# Patient Record
Sex: MALE | Race: WHITE | Employment: OTHER | URBAN - METROPOLITAN AREA
[De-identification: names, ages, dates, MRNs, and addresses within clinical notes are randomized per-mention and may not be internally consistent; named-entity substitution may affect disease eponyms.]

---

## 2018-08-20 ENCOUNTER — HOSPITAL ENCOUNTER (OUTPATIENT)
Age: 58
Setting detail: SPECIMEN
Discharge: HOME | End: 2018-08-20
Payer: MEDICAID

## 2018-08-20 LAB
ALBUMIN SERPL-MCNC: 4.8 G/DL (ref 3.5–5.2)
ALBUMIN/GLOBULIN RATIO: 2.1 (ref 1–2.5)
ALP BLD-CCNC: 72 U/L (ref 40–129)
ALT SERPL-CCNC: 11 U/L (ref 5–41)
ANION GAP SERPL CALCULATED.3IONS-SCNC: 12 MMOL/L (ref 9–17)
AST SERPL-CCNC: 20 U/L
BILIRUB SERPL-MCNC: 0.65 MG/DL (ref 0.3–1.2)
BUN BLDV-MCNC: 5 MG/DL (ref 8–23)
BUN/CREAT BLD: ABNORMAL (ref 9–20)
CALCIUM SERPL-MCNC: 9.4 MG/DL (ref 8.6–10.4)
CHLORIDE BLD-SCNC: 97 MMOL/L (ref 98–107)
CHOLESTEROL, FASTING: 169 MG/DL
CHOLESTEROL/HDL RATIO: 2.7
CO2: 27 MMOL/L (ref 20–31)
CREAT SERPL-MCNC: 0.51 MG/DL (ref 0.7–1.2)
GFR AFRICAN AMERICAN: >60 ML/MIN
GFR NON-AFRICAN AMERICAN: >60 ML/MIN
GFR SERPL CREATININE-BSD FRML MDRD: ABNORMAL ML/MIN/{1.73_M2}
GFR SERPL CREATININE-BSD FRML MDRD: ABNORMAL ML/MIN/{1.73_M2}
GLUCOSE FASTING: 95 MG/DL (ref 70–99)
HDLC SERPL-MCNC: 63 MG/DL
LDL CHOLESTEROL: 93 MG/DL (ref 0–130)
POTASSIUM SERPL-SCNC: 5 MMOL/L (ref 3.7–5.3)
SODIUM BLD-SCNC: 136 MMOL/L (ref 135–144)
TOTAL PROTEIN: 7.1 G/DL (ref 6.4–8.3)
TRIGLYCERIDE, FASTING: 67 MG/DL
VLDLC SERPL CALC-MCNC: NORMAL MG/DL (ref 1–30)

## 2018-08-21 LAB
DATE, STOOL #1: NORMAL
DATE, STOOL #2: NORMAL
DATE, STOOL #3: NORMAL
HEMOCCULT SP1 STL QL: NEGATIVE
HEMOCCULT SP2 STL QL: NEGATIVE
HEMOCCULT SP3 STL QL: NEGATIVE
TIME, STOOL #1: NORMAL
TIME, STOOL #2: NORMAL
TIME, STOOL #3: NORMAL

## 2023-05-14 ENCOUNTER — APPOINTMENT (OUTPATIENT)
Dept: GENERAL RADIOLOGY | Age: 63
End: 2023-05-14
Payer: MEDICAID

## 2023-05-14 ENCOUNTER — HOSPITAL ENCOUNTER (INPATIENT)
Age: 63
LOS: 10 days | Discharge: NURSING, REHAB OR LONG-TERM CARE | End: 2023-05-24
Attending: EMERGENCY MEDICINE | Admitting: SURGERY
Payer: MEDICAID

## 2023-05-14 ENCOUNTER — APPOINTMENT (OUTPATIENT)
Dept: MRI IMAGING | Age: 63
End: 2023-05-14
Payer: MEDICAID

## 2023-05-14 ENCOUNTER — HOSPITAL ENCOUNTER (EMERGENCY)
Age: 63
Discharge: ANOTHER HOSPITAL | End: 2023-05-14
Attending: EMERGENCY MEDICINE
Payer: MEDICAID

## 2023-05-14 ENCOUNTER — APPOINTMENT (OUTPATIENT)
Dept: CT IMAGING | Age: 63
End: 2023-05-14
Payer: MEDICAID

## 2023-05-14 VITALS
OXYGEN SATURATION: 98 % | DIASTOLIC BLOOD PRESSURE: 64 MMHG | HEIGHT: 67 IN | TEMPERATURE: 98.1 F | SYSTOLIC BLOOD PRESSURE: 144 MMHG | BODY MASS INDEX: 18.4 KG/M2 | RESPIRATION RATE: 17 BRPM | HEART RATE: 112 BPM

## 2023-05-14 DIAGNOSIS — I62.9 INTRACRANIAL HEMORRHAGE (HCC): Primary | ICD-10-CM

## 2023-05-14 DIAGNOSIS — E83.42 HYPOMAGNESEMIA: ICD-10-CM

## 2023-05-14 DIAGNOSIS — E87.1 HYPONATREMIA: Primary | ICD-10-CM

## 2023-05-14 DIAGNOSIS — I62.9 INTRACRANIAL BLEED (HCC): ICD-10-CM

## 2023-05-14 DIAGNOSIS — S06.5XAA SUBDURAL HEMATOMA (HCC): ICD-10-CM

## 2023-05-14 DIAGNOSIS — S72.101A CLOSED FRACTURE OF TROCHANTER OF RIGHT FEMUR, INITIAL ENCOUNTER (HCC): ICD-10-CM

## 2023-05-14 LAB
25(OH)D3 SERPL-MCNC: <6 NG/ML
ABO/RH: NORMAL
ABSOLUTE EOS #: 0 K/UL (ref 0–0.4)
ABSOLUTE LYMPH #: 0.7 K/UL (ref 1–4.8)
ABSOLUTE MONO #: 0.5 K/UL (ref 0.1–1.3)
ALBUMIN SERPL-MCNC: 3.4 G/DL (ref 3.5–5.2)
ALBUMIN SERPL-MCNC: 4 G/DL (ref 3.5–5.2)
ALLEN TEST: ABNORMAL
ALP SERPL-CCNC: 93 U/L (ref 40–129)
ALT SERPL-CCNC: 48 U/L (ref 5–41)
ANION GAP SERPL CALCULATED.3IONS-SCNC: 12 MMOL/L (ref 9–17)
ANION GAP SERPL CALCULATED.3IONS-SCNC: 16 MMOL/L (ref 9–17)
ANTIBODY SCREEN: NEGATIVE
ARM BAND NUMBER: NORMAL
AST SERPL-CCNC: 59 U/L
BASOPHILS # BLD: 1 % (ref 0–2)
BASOPHILS ABSOLUTE: 0.1 K/UL (ref 0–0.2)
BILIRUB SERPL-MCNC: 1 MG/DL (ref 0.3–1.2)
BLOOD BANK SPECIMEN: ABNORMAL
BUN SERPL-MCNC: 4 MG/DL (ref 8–23)
BUN SERPL-MCNC: 5 MG/DL (ref 8–23)
CALCIUM SERPL-MCNC: 9.2 MG/DL (ref 8.6–10.4)
CARBOXYHEMOGLOBIN: ABNORMAL %
CHLORIDE SERPL-SCNC: 82 MMOL/L (ref 98–107)
CHLORIDE SERPL-SCNC: 82 MMOL/L (ref 98–107)
CO2 SERPL-SCNC: 22 MMOL/L (ref 20–31)
CO2 SERPL-SCNC: 28 MMOL/L (ref 20–31)
COLLAGEN ADENOSINE-5'-DIPHOSPHATE (ADP) TIME: 110 SEC (ref 67–112)
COLLAGEN EPINEPHRINE TIME: 192 SEC (ref 85–172)
CREAT SERPL-MCNC: 0.22 MG/DL (ref 0.7–1.2)
CREAT SERPL-MCNC: <0.4 MG/DL (ref 0.7–1.2)
EOSINOPHILS RELATIVE PERCENT: 0 % (ref 0–4)
ETHANOL PERCENT: 0.02 %
ETHANOL PERCENT: <0.01 %
ETHANOL: 18 MG/DL
ETHANOL: <10 MG/DL
EXPIRATION DATE: NORMAL
FIO2: ABNORMAL
GFR SERPL CREATININE-BSD FRML MDRD: >60 ML/MIN/1.73M2
GFR SERPL CREATININE-BSD FRML MDRD: ABNORMAL ML/MIN/1.73M2
GLUCOSE SERPL-MCNC: 101 MG/DL (ref 70–99)
GLUCOSE SERPL-MCNC: 90 MG/DL (ref 70–99)
HCG QUALITATIVE: ABNORMAL
HCO3 VENOUS: ABNORMAL MMOL/L (ref 24–30)
HCT VFR BLD AUTO: 32.2 % (ref 40.7–50.3)
HCT VFR BLD AUTO: 37.6 % (ref 41–53)
HGB BLD-MCNC: 11.9 G/DL (ref 13–17)
HGB BLD-MCNC: 13.2 G/DL (ref 13.5–17.5)
INR PPP: 1.1
INR PPP: 1.1
LACTIC ACID, SEPSIS: 2.4 MMOL/L (ref 0.5–1.9)
LYMPHOCYTES # BLD: 8 % (ref 24–44)
MAGNESIUM SERPL-MCNC: 1.1 MG/DL (ref 1.6–2.6)
MCH RBC QN AUTO: 35.4 PG (ref 26–34)
MCH RBC QN AUTO: 35.7 PG (ref 25.2–33.5)
MCHC RBC AUTO-ENTMCNC: 35.1 G/DL (ref 31–37)
MCHC RBC AUTO-ENTMCNC: 37 G/DL (ref 28.4–34.8)
MCV RBC AUTO: 100.7 FL (ref 80–100)
MCV RBC AUTO: 96.7 FL (ref 82.6–102.9)
METHEMOGLOBIN: ABNORMAL %
MODE: ABNORMAL
MONOCYTES # BLD: 6 % (ref 1–7)
NEGATIVE BASE EXCESS, VEN: ABNORMAL MMOL/L (ref 0–2)
NOTIFICATION TIME: ABNORMAL
NOTIFICATION: ABNORMAL
NRBC AUTOMATED: 0 PER 100 WBC
O2 DEVICE/FLOW/%: ABNORMAL
O2 SAT, VEN: ABNORMAL %
OXYHEMOGLOBIN: ABNORMAL % (ref 95–98)
PARTIAL THROMBOPLASTIN TIME: 26.1 SEC (ref 23–36.5)
PATIENT TEMP: ABNORMAL
PCO2, VEN, TEMP ADJ: ABNORMAL MMHG (ref 39–55)
PCO2, VEN: ABNORMAL MM HG (ref 39–55)
PDW BLD-RTO: 11.7 % (ref 11.8–14.4)
PDW BLD-RTO: 13 % (ref 11.5–14.9)
PEEP/CPAP: ABNORMAL
PH VENOUS: ABNORMAL (ref 7.32–7.42)
PH, VEN, TEMP ADJ: ABNORMAL (ref 7.32–7.42)
PLATELET # BLD AUTO: 168 K/UL (ref 138–453)
PLATELET # BLD AUTO: 204 K/UL (ref 150–450)
PLATELET FUNCTION INTERP: ABNORMAL
PMV BLD AUTO: 7.1 FL (ref 6–12)
PMV BLD AUTO: 9 FL (ref 8.1–13.5)
PO2, VEN, TEMP ADJ: ABNORMAL MMHG (ref 30–50)
PO2, VEN: ABNORMAL MM HG (ref 30–50)
POSITIVE BASE EXCESS, VEN: ABNORMAL MMOL/L (ref 0–2)
POTASSIUM SERPL-SCNC: 3.7 MMOL/L (ref 3.7–5.3)
POTASSIUM SERPL-SCNC: 3.7 MMOL/L (ref 3.7–5.3)
PROT SERPL-MCNC: 6.6 G/DL (ref 6.4–8.3)
PROTHROMBIN TIME: 13.9 SEC (ref 11.7–14.9)
PROTHROMBIN TIME: 14.6 SEC (ref 11.8–14.6)
PSV: ABNORMAL
PT. POSITION: ABNORMAL
RBC # BLD: 3.33 M/UL (ref 4.21–5.77)
RBC # BLD: 3.74 M/UL (ref 4.5–5.9)
RESPIRATORY RATE: ABNORMAL
SAMPLE SITE: ABNORMAL
SEG NEUTROPHILS: 85 % (ref 36–66)
SEGMENTED NEUTROPHILS ABSOLUTE COUNT: 7.6 K/UL (ref 1.3–9.1)
SET RATE: ABNORMAL
SODIUM SERPL-SCNC: 120 MMOL/L (ref 135–144)
SODIUM SERPL-SCNC: 122 MMOL/L (ref 135–144)
T4 FREE SERPL-MCNC: 1.1 NG/DL (ref 0.9–1.7)
TEXT FOR RESPIRATORY: ABNORMAL
TOTAL HB: ABNORMAL G/DL (ref 12–16)
TOTAL RATE: ABNORMAL
TROPONIN I SERPL DL<=0.01 NG/ML-MCNC: 39 NG/L (ref 0–22)
TSH SERPL-ACNC: 2.27 UIU/ML (ref 0.3–5)
VIT B12 SERPL-MCNC: 560 PG/ML (ref 232–1245)
VT: ABNORMAL
WBC # BLD AUTO: 8.4 K/UL (ref 3.5–11.3)
WBC # BLD AUTO: 9 K/UL (ref 3.5–11)

## 2023-05-14 PROCEDURE — 82306 VITAMIN D 25 HYDROXY: CPT

## 2023-05-14 PROCEDURE — 73562 X-RAY EXAM OF KNEE 3: CPT

## 2023-05-14 PROCEDURE — 86850 RBC ANTIBODY SCREEN: CPT

## 2023-05-14 PROCEDURE — 2500000003 HC RX 250 WO HCPCS: Performed by: STUDENT IN AN ORGANIZED HEALTH CARE EDUCATION/TRAINING PROGRAM

## 2023-05-14 PROCEDURE — 83036 HEMOGLOBIN GLYCOSYLATED A1C: CPT

## 2023-05-14 PROCEDURE — 82040 ASSAY OF SERUM ALBUMIN: CPT

## 2023-05-14 PROCEDURE — 36415 COLL VENOUS BLD VENIPUNCTURE: CPT

## 2023-05-14 PROCEDURE — 71045 X-RAY EXAM CHEST 1 VIEW: CPT

## 2023-05-14 PROCEDURE — 6360000002 HC RX W HCPCS: Performed by: EMERGENCY MEDICINE

## 2023-05-14 PROCEDURE — 70450 CT HEAD/BRAIN W/O DYE: CPT

## 2023-05-14 PROCEDURE — 72125 CT NECK SPINE W/O DYE: CPT

## 2023-05-14 PROCEDURE — 99285 EMERGENCY DEPT VISIT HI MDM: CPT

## 2023-05-14 PROCEDURE — 6360000004 HC RX CONTRAST MEDICATION: Performed by: STUDENT IN AN ORGANIZED HEALTH CARE EDUCATION/TRAINING PROGRAM

## 2023-05-14 PROCEDURE — 82947 ASSAY GLUCOSE BLOOD QUANT: CPT

## 2023-05-14 PROCEDURE — 84443 ASSAY THYROID STIM HORMONE: CPT

## 2023-05-14 PROCEDURE — 73502 X-RAY EXAM HIP UNI 2-3 VIEWS: CPT

## 2023-05-14 PROCEDURE — 2580000003 HC RX 258: Performed by: EMERGENCY MEDICINE

## 2023-05-14 PROCEDURE — 2500000003 HC RX 250 WO HCPCS: Performed by: EMERGENCY MEDICINE

## 2023-05-14 PROCEDURE — 3209999900 CT LUMBAR SPINE TRAUMA RECONSTRUCTION

## 2023-05-14 PROCEDURE — 85610 PROTHROMBIN TIME: CPT

## 2023-05-14 PROCEDURE — 96365 THER/PROPH/DIAG IV INF INIT: CPT

## 2023-05-14 PROCEDURE — 2000000000 HC ICU R&B

## 2023-05-14 PROCEDURE — 71260 CT THORAX DX C+: CPT

## 2023-05-14 PROCEDURE — G0480 DRUG TEST DEF 1-7 CLASSES: HCPCS

## 2023-05-14 PROCEDURE — 83735 ASSAY OF MAGNESIUM: CPT

## 2023-05-14 PROCEDURE — 6810039001 HC L1 TRAUMA PRIORITY

## 2023-05-14 PROCEDURE — 99223 1ST HOSP IP/OBS HIGH 75: CPT | Performed by: SURGERY

## 2023-05-14 PROCEDURE — 2580000003 HC RX 258: Performed by: STUDENT IN AN ORGANIZED HEALTH CARE EDUCATION/TRAINING PROGRAM

## 2023-05-14 PROCEDURE — 85025 COMPLETE CBC W/AUTO DIFF WBC: CPT

## 2023-05-14 PROCEDURE — 6370000000 HC RX 637 (ALT 250 FOR IP): Performed by: STUDENT IN AN ORGANIZED HEALTH CARE EDUCATION/TRAINING PROGRAM

## 2023-05-14 PROCEDURE — 85027 COMPLETE CBC AUTOMATED: CPT

## 2023-05-14 PROCEDURE — 96368 THER/DIAG CONCURRENT INF: CPT

## 2023-05-14 PROCEDURE — 93005 ELECTROCARDIOGRAM TRACING: CPT

## 2023-05-14 PROCEDURE — 6360000002 HC RX W HCPCS: Performed by: STUDENT IN AN ORGANIZED HEALTH CARE EDUCATION/TRAINING PROGRAM

## 2023-05-14 PROCEDURE — 86901 BLOOD TYPING SEROLOGIC RH(D): CPT

## 2023-05-14 PROCEDURE — 86900 BLOOD TYPING SEROLOGIC ABO: CPT

## 2023-05-14 PROCEDURE — 2500000003 HC RX 250 WO HCPCS

## 2023-05-14 PROCEDURE — 72170 X-RAY EXAM OF PELVIS: CPT

## 2023-05-14 PROCEDURE — 3209999900 CT THORACIC SPINE TRAUMA RECONSTRUCTION

## 2023-05-14 PROCEDURE — 82607 VITAMIN B-12: CPT

## 2023-05-14 PROCEDURE — 83605 ASSAY OF LACTIC ACID: CPT

## 2023-05-14 PROCEDURE — P9073 PLATELETS PHERESIS PATH REDU: HCPCS

## 2023-05-14 PROCEDURE — 82565 ASSAY OF CREATININE: CPT

## 2023-05-14 PROCEDURE — 73590 X-RAY EXAM OF LOWER LEG: CPT

## 2023-05-14 PROCEDURE — 84484 ASSAY OF TROPONIN QUANT: CPT

## 2023-05-14 PROCEDURE — 85576 BLOOD PLATELET AGGREGATION: CPT

## 2023-05-14 PROCEDURE — 84520 ASSAY OF UREA NITROGEN: CPT

## 2023-05-14 PROCEDURE — 80053 COMPREHEN METABOLIC PANEL: CPT

## 2023-05-14 PROCEDURE — 36430 TRANSFUSION BLD/BLD COMPNT: CPT

## 2023-05-14 PROCEDURE — 73552 X-RAY EXAM OF FEMUR 2/>: CPT

## 2023-05-14 PROCEDURE — 82805 BLOOD GASES W/O2 SATURATION: CPT

## 2023-05-14 PROCEDURE — 85730 THROMBOPLASTIN TIME PARTIAL: CPT

## 2023-05-14 PROCEDURE — 84439 ASSAY OF FREE THYROXINE: CPT

## 2023-05-14 PROCEDURE — 93005 ELECTROCARDIOGRAM TRACING: CPT | Performed by: EMERGENCY MEDICINE

## 2023-05-14 PROCEDURE — 84703 CHORIONIC GONADOTROPIN ASSAY: CPT

## 2023-05-14 PROCEDURE — 80051 ELECTROLYTE PANEL: CPT

## 2023-05-14 RX ORDER — MULTIVITAMIN WITH IRON
1 TABLET ORAL DAILY
Status: DISCONTINUED | OUTPATIENT
Start: 2023-05-14 | End: 2023-05-24 | Stop reason: HOSPADM

## 2023-05-14 RX ORDER — SODIUM CHLORIDE 9 MG/ML
INJECTION, SOLUTION INTRAVENOUS PRN
Status: DISCONTINUED | OUTPATIENT
Start: 2023-05-14 | End: 2023-05-15

## 2023-05-14 RX ORDER — SODIUM CHLORIDE 9 MG/ML
INJECTION, SOLUTION INTRAVENOUS CONTINUOUS
Status: DISCONTINUED | OUTPATIENT
Start: 2023-05-14 | End: 2023-05-14 | Stop reason: HOSPADM

## 2023-05-14 RX ORDER — ONDANSETRON 2 MG/ML
4 INJECTION INTRAMUSCULAR; INTRAVENOUS EVERY 6 HOURS PRN
Status: DISCONTINUED | OUTPATIENT
Start: 2023-05-14 | End: 2023-05-24 | Stop reason: HOSPADM

## 2023-05-14 RX ORDER — SENNA PLUS 8.6 MG/1
1 TABLET ORAL DAILY PRN
Status: DISCONTINUED | OUTPATIENT
Start: 2023-05-14 | End: 2023-05-15

## 2023-05-14 RX ORDER — MAGNESIUM SULFATE HEPTAHYDRATE 40 MG/ML
2000 INJECTION, SOLUTION INTRAVENOUS ONCE
Status: COMPLETED | OUTPATIENT
Start: 2023-05-14 | End: 2023-05-14

## 2023-05-14 RX ORDER — SODIUM CHLORIDE 0.9 % (FLUSH) 0.9 %
10 SYRINGE (ML) INJECTION PRN
Status: DISCONTINUED | OUTPATIENT
Start: 2023-05-14 | End: 2023-05-24 | Stop reason: HOSPADM

## 2023-05-14 RX ORDER — POLYETHYLENE GLYCOL 3350 17 G/17G
17 POWDER, FOR SOLUTION ORAL DAILY
Status: DISCONTINUED | OUTPATIENT
Start: 2023-05-14 | End: 2023-05-24 | Stop reason: HOSPADM

## 2023-05-14 RX ORDER — SODIUM CHLORIDE 9 MG/ML
INJECTION, SOLUTION INTRAVENOUS CONTINUOUS
Status: DISCONTINUED | OUTPATIENT
Start: 2023-05-14 | End: 2023-05-17

## 2023-05-14 RX ORDER — LANOLIN ALCOHOL/MO/W.PET/CERES
100 CREAM (GRAM) TOPICAL DAILY
Status: DISCONTINUED | OUTPATIENT
Start: 2023-05-14 | End: 2023-05-15

## 2023-05-14 RX ORDER — PHENOBARBITAL 32.4 MG/1
32.4 TABLET ORAL 3 TIMES DAILY
Status: COMPLETED | OUTPATIENT
Start: 2023-05-17 | End: 2023-05-18

## 2023-05-14 RX ORDER — LEVETIRACETAM 10 MG/ML
1000 INJECTION INTRAVASCULAR ONCE
Status: COMPLETED | OUTPATIENT
Start: 2023-05-14 | End: 2023-05-14

## 2023-05-14 RX ORDER — ACETAMINOPHEN 650 MG/1
650 SUPPOSITORY RECTAL EVERY 6 HOURS PRN
Status: DISCONTINUED | OUTPATIENT
Start: 2023-05-14 | End: 2023-05-14

## 2023-05-14 RX ORDER — 0.9 % SODIUM CHLORIDE 0.9 %
1000 INTRAVENOUS SOLUTION INTRAVENOUS ONCE
Status: COMPLETED | OUTPATIENT
Start: 2023-05-14 | End: 2023-05-14

## 2023-05-14 RX ORDER — DIAZEPAM 5 MG/ML
5 INJECTION, SOLUTION INTRAMUSCULAR; INTRAVENOUS EVERY 6 HOURS PRN
Status: DISCONTINUED | OUTPATIENT
Start: 2023-05-14 | End: 2023-05-14

## 2023-05-14 RX ORDER — THIAMINE HYDROCHLORIDE 100 MG/ML
100 INJECTION, SOLUTION INTRAMUSCULAR; INTRAVENOUS DAILY
Status: DISCONTINUED | OUTPATIENT
Start: 2023-05-14 | End: 2023-05-14

## 2023-05-14 RX ORDER — SODIUM CHLORIDE 0.9 % (FLUSH) 0.9 %
10 SYRINGE (ML) INJECTION EVERY 12 HOURS SCHEDULED
Status: DISCONTINUED | OUTPATIENT
Start: 2023-05-14 | End: 2023-05-24 | Stop reason: HOSPADM

## 2023-05-14 RX ORDER — METOPROLOL TARTRATE 5 MG/5ML
5 INJECTION INTRAVENOUS ONCE
Status: COMPLETED | OUTPATIENT
Start: 2023-05-14 | End: 2023-05-14

## 2023-05-14 RX ORDER — ONDANSETRON 4 MG/1
4 TABLET, ORALLY DISINTEGRATING ORAL EVERY 8 HOURS PRN
Status: DISCONTINUED | OUTPATIENT
Start: 2023-05-14 | End: 2023-05-24 | Stop reason: HOSPADM

## 2023-05-14 RX ORDER — OXYCODONE HYDROCHLORIDE 5 MG/1
5 TABLET ORAL EVERY 6 HOURS PRN
Status: DISCONTINUED | OUTPATIENT
Start: 2023-05-14 | End: 2023-05-15

## 2023-05-14 RX ORDER — ERGOCALCIFEROL 1.25 MG/1
50000 CAPSULE ORAL WEEKLY
Qty: 8 CAPSULE | Refills: 0 | Status: ON HOLD | OUTPATIENT
Start: 2023-05-14 | End: 2023-07-03

## 2023-05-14 RX ORDER — ACETAMINOPHEN 500 MG
1000 TABLET ORAL EVERY 8 HOURS SCHEDULED
Status: DISCONTINUED | OUTPATIENT
Start: 2023-05-14 | End: 2023-05-24 | Stop reason: HOSPADM

## 2023-05-14 RX ORDER — ACETAMINOPHEN 325 MG/1
650 TABLET ORAL EVERY 6 HOURS PRN
Status: DISCONTINUED | OUTPATIENT
Start: 2023-05-14 | End: 2023-05-14

## 2023-05-14 RX ORDER — PHENOBARBITAL 64.8 MG/1
64.8 TABLET ORAL 3 TIMES DAILY
Status: COMPLETED | OUTPATIENT
Start: 2023-05-14 | End: 2023-05-16

## 2023-05-14 RX ORDER — FOLIC ACID 1 MG/1
1 TABLET ORAL DAILY
Status: DISCONTINUED | OUTPATIENT
Start: 2023-05-14 | End: 2023-05-24 | Stop reason: HOSPADM

## 2023-05-14 RX ORDER — METHOCARBAMOL 500 MG/1
500 TABLET, FILM COATED ORAL 4 TIMES DAILY
Status: DISCONTINUED | OUTPATIENT
Start: 2023-05-14 | End: 2023-05-15

## 2023-05-14 RX ADMIN — METOPROLOL TARTRATE 5 MG: 1 INJECTION, SOLUTION INTRAVENOUS at 23:42

## 2023-05-14 RX ADMIN — SODIUM CHLORIDE: 9 INJECTION, SOLUTION INTRAVENOUS at 20:15

## 2023-05-14 RX ADMIN — SODIUM CHLORIDE: 9 INJECTION, SOLUTION INTRAVENOUS at 14:47

## 2023-05-14 RX ADMIN — PHENOBARBITAL 64.8 MG: 64.8 TABLET ORAL at 20:27

## 2023-05-14 RX ADMIN — THIAMINE HYDROCHLORIDE 100 MG: 200 INJECTION, SOLUTION INTRAMUSCULAR; INTRAVENOUS at 14:46

## 2023-05-14 RX ADMIN — Medication 100 MG: at 20:32

## 2023-05-14 RX ADMIN — SODIUM CHLORIDE 1000 ML: 9 INJECTION, SOLUTION INTRAVENOUS at 13:32

## 2023-05-14 RX ADMIN — METHOCARBAMOL 500 MG: 500 TABLET ORAL at 20:33

## 2023-05-14 RX ADMIN — LEVETIRACETAM 1000 MG: 10 INJECTION, SOLUTION INTRAVENOUS at 16:47

## 2023-05-14 RX ADMIN — ALCOHOL 1 TABLET: 70.47 GEL TOPICAL at 20:26

## 2023-05-14 RX ADMIN — ACETAMINOPHEN 1000 MG: 500 TABLET ORAL at 20:33

## 2023-05-14 RX ADMIN — MAGNESIUM SULFATE HEPTAHYDRATE 2000 MG: 40 INJECTION, SOLUTION INTRAVENOUS at 14:28

## 2023-05-14 RX ADMIN — SODIUM CHLORIDE, PRESERVATIVE FREE 20 MG: 5 INJECTION INTRAVENOUS at 20:28

## 2023-05-14 RX ADMIN — FOLIC ACID 1 MG: 1 TABLET ORAL at 20:33

## 2023-05-14 RX ADMIN — IOPAMIDOL 130 ML: 755 INJECTION, SOLUTION INTRAVENOUS at 16:03

## 2023-05-14 ASSESSMENT — ENCOUNTER SYMPTOMS
SORE THROAT: 0
COUGH: 0
VOMITING: 0
BACK PAIN: 1
SHORTNESS OF BREATH: 0
COLOR CHANGE: 0
ABDOMINAL PAIN: 0
NAUSEA: 0

## 2023-05-14 ASSESSMENT — LIFESTYLE VARIABLES
HOW OFTEN DO YOU HAVE A DRINK CONTAINING ALCOHOL: 4 OR MORE TIMES A WEEK
HOW MANY STANDARD DRINKS CONTAINING ALCOHOL DO YOU HAVE ON A TYPICAL DAY: 10 OR MORE

## 2023-05-15 ENCOUNTER — APPOINTMENT (OUTPATIENT)
Age: 63
End: 2023-05-15
Payer: MEDICAID

## 2023-05-15 ENCOUNTER — APPOINTMENT (OUTPATIENT)
Dept: MRI IMAGING | Age: 63
End: 2023-05-15
Payer: MEDICAID

## 2023-05-15 LAB
ALBUMIN SERPL-MCNC: 2.8 G/DL (ref 3.5–5.2)
ALBUMIN/GLOBULIN RATIO: 1.3 (ref 1–2.5)
ALP SERPL-CCNC: 61 U/L (ref 40–129)
ALT SERPL-CCNC: 34 U/L (ref 5–41)
AMMONIA PLAS-SCNC: 25 UMOL/L (ref 16–60)
ANION GAP SERPL CALCULATED.3IONS-SCNC: 10 MMOL/L (ref 9–17)
AST SERPL-CCNC: 54 U/L
BILIRUB DIRECT SERPL-MCNC: 0.4 MG/DL
BILIRUB INDIRECT SERPL-MCNC: 0.5 MG/DL (ref 0–1)
BILIRUB SERPL-MCNC: 0.9 MG/DL (ref 0.3–1.2)
BILIRUB UR QL STRIP: ABNORMAL
BLD PROD TYP BPU: NORMAL
BLOOD BANK BLOOD PRODUCT EXPIRATION DATE: NORMAL
BLOOD BANK ISBT PRODUCT BLOOD TYPE: 6200
BLOOD BANK PRODUCT CODE: NORMAL
BLOOD BANK UNIT TYPE AND RH: NORMAL
BPU ID: NORMAL
BUN SERPL-MCNC: 3 MG/DL (ref 8–23)
CA-I BLD-SCNC: 0.93 MMOL/L (ref 1.13–1.33)
CA-I BLD-SCNC: 1.05 MMOL/L (ref 1.13–1.33)
CALCIUM SERPL-MCNC: 8.3 MG/DL (ref 8.6–10.4)
CASTS #/AREA URNS LPF: NORMAL /LPF (ref 0–8)
CHLORIDE SERPL-SCNC: 91 MMOL/L (ref 98–107)
CLARITY UR: CLEAR
CO2 SERPL-SCNC: 25 MMOL/L (ref 20–31)
COLOR UR: ABNORMAL
CREAT SERPL-MCNC: 0.31 MG/DL (ref 0.7–1.2)
DISPENSE STATUS BLOOD BANK: NORMAL
EKG ATRIAL RATE: 115 BPM
EKG ATRIAL RATE: 144 BPM
EKG P AXIS: 85 DEGREES
EKG P-R INTERVAL: 164 MS
EKG Q-T INTERVAL: 318 MS
EKG Q-T INTERVAL: 324 MS
EKG QRS DURATION: 86 MS
EKG QRS DURATION: 86 MS
EKG QTC CALCULATION (BAZETT): 439 MS
EKG QTC CALCULATION (BAZETT): 465 MS
EKG R AXIS: -78 DEGREES
EKG R AXIS: -82 DEGREES
EKG T AXIS: 112 DEGREES
EKG T AXIS: 88 DEGREES
EKG VENTRICULAR RATE: 115 BPM
EKG VENTRICULAR RATE: 124 BPM
EPI CELLS #/AREA URNS HPF: NORMAL /HPF (ref 0–5)
ERYTHROCYTE [DISTWIDTH] IN BLOOD BY AUTOMATED COUNT: 12 % (ref 11.8–14.4)
EST. AVERAGE GLUCOSE BLD GHB EST-MCNC: 97 MG/DL
ETHANOL PERCENT: <0.01 %
ETHANOL: <10 MG/DL
GFR SERPL CREATININE-BSD FRML MDRD: >60 ML/MIN/1.73M2
GLUCOSE SERPL-MCNC: 84 MG/DL (ref 70–99)
GLUCOSE UR STRIP.AUTO-MCNC: NEGATIVE MG/DL
HBA1C MFR BLD: 5 % (ref 4–6)
HCT VFR BLD AUTO: 29 % (ref 40.7–50.3)
HCT VFR BLD AUTO: 35.3 % (ref 40.7–50.3)
HGB BLD-MCNC: 10 G/DL (ref 13–17)
HGB BLD-MCNC: 12.2 G/DL (ref 13–17)
HGB UR QL STRIP.AUTO: ABNORMAL
KETONES UR STRIP.AUTO-MCNC: ABNORMAL MG/DL
LEUKOCYTE ESTERASE UR QL STRIP: NEGATIVE
MAGNESIUM SERPL-MCNC: 1.5 MG/DL (ref 1.6–2.6)
MAGNESIUM SERPL-MCNC: 2.7 MG/DL (ref 1.6–2.6)
MCH RBC QN AUTO: 35.3 PG (ref 25.2–33.5)
MCH RBC QN AUTO: 36 PG (ref 25.2–33.5)
MCHC RBC AUTO-ENTMCNC: 34.5 G/DL (ref 28.4–34.8)
MCHC RBC AUTO-ENTMCNC: 34.6 G/DL (ref 28.4–34.8)
MCV RBC AUTO: 102 FL (ref 82.6–102.9)
MCV RBC AUTO: 104.3 FL (ref 82.6–102.9)
NITRITE UR QL STRIP: NEGATIVE
NRBC AUTOMATED: 0 PER 100 WBC
NRBC AUTOMATED: 0 PER 100 WBC
PDW BLD-RTO: 11.5 % (ref 11.8–14.4)
PHOSPHATE SERPL-MCNC: 3.4 MG/DL (ref 2.5–4.5)
PHOSPHATE SERPL-MCNC: 3.4 MG/DL (ref 2.5–4.5)
PLATELET # BLD AUTO: 146 K/UL (ref 138–453)
PLATELET # BLD AUTO: 155 K/UL (ref 138–453)
PMV BLD AUTO: 8.6 FL (ref 8.1–13.5)
PMV BLD AUTO: 9.3 FL (ref 8.1–13.5)
POTASSIUM SERPL-SCNC: 3.6 MMOL/L (ref 3.7–5.3)
PROT SERPL-MCNC: 5 G/DL (ref 6.4–8.3)
PROT UR STRIP-MCNC: NEGATIVE MG/DL
PROT UR STRIP.AUTO-MCNC: 6 MG/DL (ref 5–8)
RBC # BLD AUTO: 2.78 M/UL (ref 4.21–5.77)
RBC # BLD: 3.46 M/UL (ref 4.21–5.77)
RBC #/AREA URNS HPF: NORMAL /HPF (ref 0–4)
SODIUM SERPL-SCNC: 126 MMOL/L (ref 135–144)
SP GR UR STRIP.AUTO: 1.02 (ref 1–1.03)
TRANSFUSION STATUS: NORMAL
TROPONIN I SERPL DL<=0.01 NG/ML-MCNC: 42 NG/L (ref 0–22)
TROPONIN I SERPL DL<=0.01 NG/ML-MCNC: 49 NG/L (ref 0–22)
TROPONIN I SERPL DL<=0.01 NG/ML-MCNC: 50 NG/L (ref 0–22)
UNIT DIVISION: 0
UNIT ISSUE DATE/TIME: NORMAL
UROBILINOGEN UR STRIP-ACNC: NORMAL
WBC # BLD AUTO: 7 K/UL (ref 3.5–11.3)
WBC #/AREA URNS HPF: NORMAL /HPF (ref 0–5)
WBC OTHER # BLD: 5 K/UL (ref 3.5–11.3)

## 2023-05-15 PROCEDURE — 84484 ASSAY OF TROPONIN QUANT: CPT

## 2023-05-15 PROCEDURE — 6370000000 HC RX 637 (ALT 250 FOR IP)

## 2023-05-15 PROCEDURE — 2500000003 HC RX 250 WO HCPCS: Performed by: SURGERY

## 2023-05-15 PROCEDURE — 6370000000 HC RX 637 (ALT 250 FOR IP): Performed by: STUDENT IN AN ORGANIZED HEALTH CARE EDUCATION/TRAINING PROGRAM

## 2023-05-15 PROCEDURE — 2580000003 HC RX 258

## 2023-05-15 PROCEDURE — 6360000002 HC RX W HCPCS: Performed by: NURSE PRACTITIONER

## 2023-05-15 PROCEDURE — 80076 HEPATIC FUNCTION PANEL: CPT

## 2023-05-15 PROCEDURE — 51701 INSERT BLADDER CATHETER: CPT

## 2023-05-15 PROCEDURE — 51798 US URINE CAPACITY MEASURE: CPT

## 2023-05-15 PROCEDURE — 2580000003 HC RX 258: Performed by: STUDENT IN AN ORGANIZED HEALTH CARE EDUCATION/TRAINING PROGRAM

## 2023-05-15 PROCEDURE — 94640 AIRWAY INHALATION TREATMENT: CPT

## 2023-05-15 PROCEDURE — 70450 CT HEAD/BRAIN W/O DYE: CPT

## 2023-05-15 PROCEDURE — 6360000002 HC RX W HCPCS: Performed by: STUDENT IN AN ORGANIZED HEALTH CARE EDUCATION/TRAINING PROGRAM

## 2023-05-15 PROCEDURE — 93005 ELECTROCARDIOGRAM TRACING: CPT | Performed by: EMERGENCY MEDICINE

## 2023-05-15 PROCEDURE — 84100 ASSAY OF PHOSPHORUS: CPT

## 2023-05-15 PROCEDURE — 99233 SBSQ HOSP IP/OBS HIGH 50: CPT | Performed by: SURGERY

## 2023-05-15 PROCEDURE — 80048 BASIC METABOLIC PNL TOTAL CA: CPT

## 2023-05-15 PROCEDURE — 81001 URINALYSIS AUTO W/SCOPE: CPT

## 2023-05-15 PROCEDURE — 93010 ELECTROCARDIOGRAM REPORT: CPT | Performed by: INTERNAL MEDICINE

## 2023-05-15 PROCEDURE — 36415 COLL VENOUS BLD VENIPUNCTURE: CPT

## 2023-05-15 PROCEDURE — 83735 ASSAY OF MAGNESIUM: CPT

## 2023-05-15 PROCEDURE — 82330 ASSAY OF CALCIUM: CPT

## 2023-05-15 PROCEDURE — 82140 ASSAY OF AMMONIA: CPT

## 2023-05-15 PROCEDURE — 6360000002 HC RX W HCPCS

## 2023-05-15 PROCEDURE — 6370000000 HC RX 637 (ALT 250 FOR IP): Performed by: NURSE PRACTITIONER

## 2023-05-15 PROCEDURE — 85027 COMPLETE CBC AUTOMATED: CPT

## 2023-05-15 PROCEDURE — 94761 N-INVAS EAR/PLS OXIMETRY MLT: CPT

## 2023-05-15 PROCEDURE — 73721 MRI JNT OF LWR EXTRE W/O DYE: CPT

## 2023-05-15 PROCEDURE — G0480 DRUG TEST DEF 1-7 CLASSES: HCPCS

## 2023-05-15 PROCEDURE — 2060000000 HC ICU INTERMEDIATE R&B

## 2023-05-15 PROCEDURE — 2580000003 HC RX 258: Performed by: NURSE PRACTITIONER

## 2023-05-15 RX ORDER — ALBUTEROL SULFATE 90 UG/1
2 AEROSOL, METERED RESPIRATORY (INHALATION) EVERY 4 HOURS PRN
Status: DISCONTINUED | OUTPATIENT
Start: 2023-05-15 | End: 2023-05-24 | Stop reason: HOSPADM

## 2023-05-15 RX ORDER — MAGNESIUM SULFATE IN WATER 40 MG/ML
2000 INJECTION, SOLUTION INTRAVENOUS ONCE
Status: COMPLETED | OUTPATIENT
Start: 2023-05-15 | End: 2023-05-15

## 2023-05-15 RX ORDER — MAGNESIUM SULFATE IN WATER 40 MG/ML
2000 INJECTION, SOLUTION INTRAVENOUS
Status: DISPENSED | OUTPATIENT
Start: 2023-05-15 | End: 2023-05-15

## 2023-05-15 RX ORDER — SODIUM CHLORIDE, SODIUM LACTATE, POTASSIUM CHLORIDE, AND CALCIUM CHLORIDE .6; .31; .03; .02 G/100ML; G/100ML; G/100ML; G/100ML
1000 INJECTION, SOLUTION INTRAVENOUS ONCE
Status: COMPLETED | OUTPATIENT
Start: 2023-05-15 | End: 2023-05-15

## 2023-05-15 RX ORDER — HALOPERIDOL 5 MG/ML
5 INJECTION INTRAMUSCULAR ONCE
Status: COMPLETED | OUTPATIENT
Start: 2023-05-15 | End: 2023-05-15

## 2023-05-15 RX ORDER — POTASSIUM CHLORIDE 20MEQ/15ML
40 LIQUID (ML) ORAL ONCE
Status: COMPLETED | OUTPATIENT
Start: 2023-05-15 | End: 2023-05-15

## 2023-05-15 RX ORDER — SENNA PLUS 8.6 MG/1
1 TABLET ORAL NIGHTLY
Status: DISCONTINUED | OUTPATIENT
Start: 2023-05-15 | End: 2023-05-24 | Stop reason: HOSPADM

## 2023-05-15 RX ORDER — BUDESONIDE AND FORMOTEROL FUMARATE DIHYDRATE 80; 4.5 UG/1; UG/1
2 AEROSOL RESPIRATORY (INHALATION) 2 TIMES DAILY
Status: DISCONTINUED | OUTPATIENT
Start: 2023-05-15 | End: 2023-05-24 | Stop reason: HOSPADM

## 2023-05-15 RX ORDER — FENTANYL CITRATE 50 UG/ML
25 INJECTION, SOLUTION INTRAMUSCULAR; INTRAVENOUS ONCE
Status: DISCONTINUED | OUTPATIENT
Start: 2023-05-15 | End: 2023-05-16

## 2023-05-15 RX ORDER — LANOLIN ALCOHOL/MO/W.PET/CERES
100 CREAM (GRAM) TOPICAL DAILY
Status: DISCONTINUED | OUTPATIENT
Start: 2023-05-22 | End: 2023-05-24 | Stop reason: HOSPADM

## 2023-05-15 RX ORDER — METOPROLOL TARTRATE 5 MG/5ML
5 INJECTION INTRAVENOUS EVERY 5 MIN PRN
Status: COMPLETED | OUTPATIENT
Start: 2023-05-15 | End: 2023-05-15

## 2023-05-15 RX ORDER — TAMSULOSIN HYDROCHLORIDE 0.4 MG/1
0.4 CAPSULE ORAL DAILY
Status: DISCONTINUED | OUTPATIENT
Start: 2023-05-15 | End: 2023-05-24 | Stop reason: HOSPADM

## 2023-05-15 RX ORDER — FAMOTIDINE 20 MG/1
20 TABLET, FILM COATED ORAL 2 TIMES DAILY
Status: DISCONTINUED | OUTPATIENT
Start: 2023-05-15 | End: 2023-05-24 | Stop reason: HOSPADM

## 2023-05-15 RX ORDER — 0.9 % SODIUM CHLORIDE 0.9 %
500 INTRAVENOUS SOLUTION INTRAVENOUS ONCE
Status: COMPLETED | OUTPATIENT
Start: 2023-05-15 | End: 2023-05-15

## 2023-05-15 RX ORDER — IPRATROPIUM BROMIDE AND ALBUTEROL SULFATE 2.5; .5 MG/3ML; MG/3ML
1 SOLUTION RESPIRATORY (INHALATION)
Status: DISCONTINUED | OUTPATIENT
Start: 2023-05-15 | End: 2023-05-15

## 2023-05-15 RX ADMIN — FAMOTIDINE 20 MG: 20 TABLET, FILM COATED ORAL at 21:31

## 2023-05-15 RX ADMIN — THIAMINE HYDROCHLORIDE 500 MG: 100 INJECTION, SOLUTION INTRAMUSCULAR; INTRAVENOUS at 10:15

## 2023-05-15 RX ADMIN — MAGNESIUM SULFATE HEPTAHYDRATE 2000 MG: 40 INJECTION, SOLUTION INTRAVENOUS at 15:48

## 2023-05-15 RX ADMIN — FAMOTIDINE 20 MG: 20 TABLET, FILM COATED ORAL at 07:49

## 2023-05-15 RX ADMIN — PHENOBARBITAL 64.8 MG: 64.8 TABLET ORAL at 18:06

## 2023-05-15 RX ADMIN — METHOCARBAMOL 500 MG: 500 TABLET ORAL at 15:49

## 2023-05-15 RX ADMIN — THIAMINE HYDROCHLORIDE 500 MG: 100 INJECTION, SOLUTION INTRAMUSCULAR; INTRAVENOUS at 18:52

## 2023-05-15 RX ADMIN — SODIUM CHLORIDE 500 ML: 9 INJECTION, SOLUTION INTRAVENOUS at 22:29

## 2023-05-15 RX ADMIN — HALOPERIDOL LACTATE 5 MG: 5 INJECTION, SOLUTION INTRAMUSCULAR at 13:19

## 2023-05-15 RX ADMIN — METOPROLOL TARTRATE 5 MG: 1 INJECTION, SOLUTION INTRAVENOUS at 02:18

## 2023-05-15 RX ADMIN — ACETAMINOPHEN 1000 MG: 500 TABLET ORAL at 06:40

## 2023-05-15 RX ADMIN — SENNOSIDES 8.6 MG: 8.6 TABLET, COATED ORAL at 21:31

## 2023-05-15 RX ADMIN — METHOCARBAMOL 500 MG: 500 TABLET ORAL at 18:49

## 2023-05-15 RX ADMIN — CALCIUM GLUCONATE 2000 MG: 20 INJECTION, SOLUTION INTRAVENOUS at 09:22

## 2023-05-15 RX ADMIN — METOPROLOL TARTRATE 5 MG: 1 INJECTION, SOLUTION INTRAVENOUS at 02:27

## 2023-05-15 RX ADMIN — POLYETHYLENE GLYCOL 3350 17 G: 17 POWDER, FOR SOLUTION ORAL at 07:50

## 2023-05-15 RX ADMIN — SODIUM CHLORIDE: 9 INJECTION, SOLUTION INTRAVENOUS at 06:33

## 2023-05-15 RX ADMIN — MAGNESIUM SULFATE HEPTAHYDRATE 2000 MG: 40 INJECTION, SOLUTION INTRAVENOUS at 11:34

## 2023-05-15 RX ADMIN — BUDESONIDE AND FORMOTEROL FUMARATE DIHYDRATE 2 PUFF: 80; 4.5 AEROSOL RESPIRATORY (INHALATION) at 11:36

## 2023-05-15 RX ADMIN — ACETAMINOPHEN 1000 MG: 500 TABLET ORAL at 15:49

## 2023-05-15 RX ADMIN — ONDANSETRON 4 MG: 2 INJECTION INTRAMUSCULAR; INTRAVENOUS at 08:15

## 2023-05-15 RX ADMIN — ALCOHOL 1 TABLET: 70.47 GEL TOPICAL at 07:49

## 2023-05-15 RX ADMIN — FOLIC ACID 1 MG: 1 TABLET ORAL at 07:49

## 2023-05-15 RX ADMIN — SODIUM CHLORIDE, PRESERVATIVE FREE 10 ML: 5 INJECTION INTRAVENOUS at 20:58

## 2023-05-15 RX ADMIN — MAGNESIUM SULFATE HEPTAHYDRATE 2000 MG: 40 INJECTION, SOLUTION INTRAVENOUS at 09:15

## 2023-05-15 RX ADMIN — ACETAMINOPHEN 1000 MG: 500 TABLET ORAL at 20:58

## 2023-05-15 RX ADMIN — SODIUM CHLORIDE, POTASSIUM CHLORIDE, SODIUM LACTATE AND CALCIUM CHLORIDE 1000 ML: 600; 310; 30; 20 INJECTION, SOLUTION INTRAVENOUS at 11:44

## 2023-05-15 RX ADMIN — POTASSIUM CHLORIDE 40 MEQ: 40 SOLUTION ORAL at 09:19

## 2023-05-15 RX ADMIN — TAMSULOSIN HYDROCHLORIDE 0.4 MG: 0.4 CAPSULE ORAL at 19:50

## 2023-05-15 RX ADMIN — PHENOBARBITAL 64.8 MG: 64.8 TABLET ORAL at 07:49

## 2023-05-15 RX ADMIN — PHENOBARBITAL 64.8 MG: 64.8 TABLET ORAL at 20:58

## 2023-05-15 RX ADMIN — METHOCARBAMOL 500 MG: 500 TABLET ORAL at 07:49

## 2023-05-15 RX ADMIN — SODIUM CHLORIDE, POTASSIUM CHLORIDE, SODIUM LACTATE AND CALCIUM CHLORIDE 1000 ML: 600; 310; 30; 20 INJECTION, SOLUTION INTRAVENOUS at 19:46

## 2023-05-15 RX ADMIN — OXYCODONE HYDROCHLORIDE 5 MG: 5 TABLET ORAL at 12:43

## 2023-05-15 ASSESSMENT — PAIN SCALES - GENERAL
PAINLEVEL_OUTOF10: 0
PAINLEVEL_OUTOF10: 7

## 2023-05-16 PROBLEM — I48.91 NEW ONSET A-FIB (HCC): Status: ACTIVE | Noted: 2023-05-16

## 2023-05-16 LAB
ANION GAP SERPL CALCULATED.3IONS-SCNC: 16 MMOL/L (ref 9–17)
BUN SERPL-MCNC: 4 MG/DL (ref 8–23)
CALCIUM SERPL-MCNC: 8.2 MG/DL (ref 8.6–10.4)
CHLORIDE SERPL-SCNC: 96 MMOL/L (ref 98–107)
CO2 SERPL-SCNC: 17 MMOL/L (ref 20–31)
CREAT SERPL-MCNC: 0.32 MG/DL (ref 0.7–1.2)
EKG ATRIAL RATE: 73 BPM
EKG P AXIS: 71 DEGREES
EKG P-R INTERVAL: 164 MS
EKG Q-T INTERVAL: 422 MS
EKG QRS DURATION: 92 MS
EKG QTC CALCULATION (BAZETT): 464 MS
EKG R AXIS: -77 DEGREES
EKG T AXIS: 62 DEGREES
EKG VENTRICULAR RATE: 73 BPM
GFR SERPL CREATININE-BSD FRML MDRD: >60 ML/MIN/1.73M2
GLUCOSE SERPL-MCNC: 87 MG/DL (ref 70–99)
LV EF: 55 %
LVEF MODALITY: NORMAL
POTASSIUM SERPL-SCNC: 3.5 MMOL/L (ref 3.7–5.3)
SODIUM SERPL-SCNC: 129 MMOL/L (ref 135–144)

## 2023-05-16 PROCEDURE — 6370000000 HC RX 637 (ALT 250 FOR IP)

## 2023-05-16 PROCEDURE — 94640 AIRWAY INHALATION TREATMENT: CPT

## 2023-05-16 PROCEDURE — 2060000000 HC ICU INTERMEDIATE R&B

## 2023-05-16 PROCEDURE — 99254 IP/OBS CNSLTJ NEW/EST MOD 60: CPT | Performed by: INTERNAL MEDICINE

## 2023-05-16 PROCEDURE — 99221 1ST HOSP IP/OBS SF/LOW 40: CPT | Performed by: PHYSICAL MEDICINE & REHABILITATION

## 2023-05-16 PROCEDURE — 94761 N-INVAS EAR/PLS OXIMETRY MLT: CPT

## 2023-05-16 PROCEDURE — 2580000003 HC RX 258: Performed by: NURSE PRACTITIONER

## 2023-05-16 PROCEDURE — 6370000000 HC RX 637 (ALT 250 FOR IP): Performed by: STUDENT IN AN ORGANIZED HEALTH CARE EDUCATION/TRAINING PROGRAM

## 2023-05-16 PROCEDURE — 2580000003 HC RX 258: Performed by: STUDENT IN AN ORGANIZED HEALTH CARE EDUCATION/TRAINING PROGRAM

## 2023-05-16 PROCEDURE — 99232 SBSQ HOSP IP/OBS MODERATE 35: CPT | Performed by: SURGERY

## 2023-05-16 PROCEDURE — 93010 ELECTROCARDIOGRAM REPORT: CPT | Performed by: INTERNAL MEDICINE

## 2023-05-16 PROCEDURE — 51798 US URINE CAPACITY MEASURE: CPT

## 2023-05-16 PROCEDURE — 6360000002 HC RX W HCPCS: Performed by: NURSE PRACTITIONER

## 2023-05-16 PROCEDURE — 93306 TTE W/DOPPLER COMPLETE: CPT

## 2023-05-16 PROCEDURE — 99212 OFFICE O/P EST SF 10 MIN: CPT

## 2023-05-16 PROCEDURE — 6370000000 HC RX 637 (ALT 250 FOR IP): Performed by: NURSE PRACTITIONER

## 2023-05-16 PROCEDURE — 2700000000 HC OXYGEN THERAPY PER DAY

## 2023-05-16 RX ADMIN — SODIUM CHLORIDE, PRESERVATIVE FREE 10 ML: 5 INJECTION INTRAVENOUS at 09:21

## 2023-05-16 RX ADMIN — ACETAMINOPHEN 1000 MG: 500 TABLET ORAL at 20:43

## 2023-05-16 RX ADMIN — ACETAMINOPHEN 1000 MG: 500 TABLET ORAL at 05:50

## 2023-05-16 RX ADMIN — POLYETHYLENE GLYCOL 3350 17 G: 17 POWDER, FOR SOLUTION ORAL at 09:17

## 2023-05-16 RX ADMIN — ALCOHOL 1 TABLET: 70.47 GEL TOPICAL at 09:20

## 2023-05-16 RX ADMIN — BUDESONIDE AND FORMOTEROL FUMARATE DIHYDRATE 2 PUFF: 80; 4.5 AEROSOL RESPIRATORY (INHALATION) at 10:04

## 2023-05-16 RX ADMIN — TAMSULOSIN HYDROCHLORIDE 0.4 MG: 0.4 CAPSULE ORAL at 09:19

## 2023-05-16 RX ADMIN — FOLIC ACID 1 MG: 1 TABLET ORAL at 09:21

## 2023-05-16 RX ADMIN — PHENOBARBITAL 64.8 MG: 64.8 TABLET ORAL at 14:09

## 2023-05-16 RX ADMIN — SENNOSIDES 8.6 MG: 8.6 TABLET, COATED ORAL at 20:42

## 2023-05-16 RX ADMIN — BUDESONIDE AND FORMOTEROL FUMARATE DIHYDRATE 2 PUFF: 80; 4.5 AEROSOL RESPIRATORY (INHALATION) at 20:35

## 2023-05-16 RX ADMIN — FAMOTIDINE 20 MG: 20 TABLET, FILM COATED ORAL at 09:19

## 2023-05-16 RX ADMIN — SODIUM CHLORIDE: 9 INJECTION, SOLUTION INTRAVENOUS at 10:32

## 2023-05-16 RX ADMIN — PHENOBARBITAL 64.8 MG: 64.8 TABLET ORAL at 09:21

## 2023-05-16 RX ADMIN — ACETAMINOPHEN 1000 MG: 500 TABLET ORAL at 14:09

## 2023-05-16 RX ADMIN — FAMOTIDINE 20 MG: 20 TABLET, FILM COATED ORAL at 20:42

## 2023-05-16 RX ADMIN — THIAMINE HYDROCHLORIDE 500 MG: 100 INJECTION, SOLUTION INTRAMUSCULAR; INTRAVENOUS at 01:02

## 2023-05-16 RX ADMIN — THIAMINE HYDROCHLORIDE 500 MG: 100 INJECTION, SOLUTION INTRAMUSCULAR; INTRAVENOUS at 17:42

## 2023-05-16 RX ADMIN — THIAMINE HYDROCHLORIDE 500 MG: 100 INJECTION, SOLUTION INTRAMUSCULAR; INTRAVENOUS at 09:18

## 2023-05-16 ASSESSMENT — PAIN SCALES - GENERAL
PAINLEVEL_OUTOF10: 1
PAINLEVEL_OUTOF10: 0
PAINLEVEL_OUTOF10: 4
PAINLEVEL_OUTOF10: 3
PAINLEVEL_OUTOF10: 0

## 2023-05-16 ASSESSMENT — PAIN DESCRIPTION - DESCRIPTORS: DESCRIPTORS: ACHING;DISCOMFORT

## 2023-05-16 ASSESSMENT — PAIN DESCRIPTION - LOCATION: LOCATION: BACK;PELVIS

## 2023-05-16 ASSESSMENT — PAIN - FUNCTIONAL ASSESSMENT: PAIN_FUNCTIONAL_ASSESSMENT: PREVENTS OR INTERFERES SOME ACTIVE ACTIVITIES AND ADLS

## 2023-05-16 ASSESSMENT — PAIN DESCRIPTION - ORIENTATION: ORIENTATION: RIGHT;LEFT

## 2023-05-17 ENCOUNTER — APPOINTMENT (OUTPATIENT)
Dept: GENERAL RADIOLOGY | Age: 63
End: 2023-05-17
Payer: MEDICAID

## 2023-05-17 ENCOUNTER — APPOINTMENT (OUTPATIENT)
Dept: CT IMAGING | Age: 63
End: 2023-05-17
Payer: MEDICAID

## 2023-05-17 LAB
ANION GAP SERPL CALCULATED.3IONS-SCNC: 10 MMOL/L (ref 9–17)
BNP SERPL-MCNC: 1311 PG/ML
BUN SERPL-MCNC: 3 MG/DL (ref 8–23)
CA-I BLD-SCNC: 1.08 MMOL/L (ref 1.13–1.33)
CALCIUM SERPL-MCNC: 8 MG/DL (ref 8.6–10.4)
CHLORIDE SERPL-SCNC: 100 MMOL/L (ref 98–107)
CO2 SERPL-SCNC: 22 MMOL/L (ref 20–31)
CREAT SERPL-MCNC: 0.27 MG/DL (ref 0.7–1.2)
CRP SERPL HS-MCNC: 164.2 MG/L (ref 0–5)
EKG ATRIAL RATE: 104 BPM
EKG P AXIS: 89 DEGREES
EKG P-R INTERVAL: 156 MS
EKG Q-T INTERVAL: 340 MS
EKG QRS DURATION: 80 MS
EKG QTC CALCULATION (BAZETT): 447 MS
EKG R AXIS: -82 DEGREES
EKG T AXIS: 86 DEGREES
EKG VENTRICULAR RATE: 104 BPM
ERYTHROCYTE [DISTWIDTH] IN BLOOD BY AUTOMATED COUNT: 11.9 % (ref 11.8–14.4)
GFR SERPL CREATININE-BSD FRML MDRD: >60 ML/MIN/1.73M2
GLUCOSE SERPL-MCNC: 114 MG/DL (ref 70–99)
HCT VFR BLD AUTO: 32.5 % (ref 40.7–50.3)
HGB BLD-MCNC: 10.4 G/DL (ref 13–17)
MAGNESIUM SERPL-MCNC: 1.3 MG/DL (ref 1.6–2.6)
MCH RBC QN AUTO: 34.2 PG (ref 25.2–33.5)
MCHC RBC AUTO-ENTMCNC: 32 G/DL (ref 28.4–34.8)
MCV RBC AUTO: 106.9 FL (ref 82.6–102.9)
NRBC AUTOMATED: 0 PER 100 WBC
PHOSPHATE SERPL-MCNC: 3.1 MG/DL (ref 2.5–4.5)
PLATELET # BLD AUTO: 152 K/UL (ref 138–453)
PMV BLD AUTO: 8.6 FL (ref 8.1–13.5)
POTASSIUM SERPL-SCNC: 3.4 MMOL/L (ref 3.7–5.3)
PROCALCITONIN SERPL-MCNC: 0.28 NG/ML
RBC # BLD AUTO: 3.04 M/UL (ref 4.21–5.77)
SODIUM SERPL-SCNC: 132 MMOL/L (ref 135–144)
WBC OTHER # BLD: 6.2 K/UL (ref 3.5–11.3)

## 2023-05-17 PROCEDURE — 71250 CT THORAX DX C-: CPT

## 2023-05-17 PROCEDURE — 6370000000 HC RX 637 (ALT 250 FOR IP): Performed by: STUDENT IN AN ORGANIZED HEALTH CARE EDUCATION/TRAINING PROGRAM

## 2023-05-17 PROCEDURE — 2700000000 HC OXYGEN THERAPY PER DAY

## 2023-05-17 PROCEDURE — 87641 MR-STAPH DNA AMP PROBE: CPT

## 2023-05-17 PROCEDURE — 6360000002 HC RX W HCPCS: Performed by: STUDENT IN AN ORGANIZED HEALTH CARE EDUCATION/TRAINING PROGRAM

## 2023-05-17 PROCEDURE — 6360000002 HC RX W HCPCS: Performed by: INTERNAL MEDICINE

## 2023-05-17 PROCEDURE — 94761 N-INVAS EAR/PLS OXIMETRY MLT: CPT

## 2023-05-17 PROCEDURE — 97530 THERAPEUTIC ACTIVITIES: CPT

## 2023-05-17 PROCEDURE — 2580000003 HC RX 258: Performed by: NURSE PRACTITIONER

## 2023-05-17 PROCEDURE — 83735 ASSAY OF MAGNESIUM: CPT

## 2023-05-17 PROCEDURE — 99221 1ST HOSP IP/OBS SF/LOW 40: CPT | Performed by: INTERNAL MEDICINE

## 2023-05-17 PROCEDURE — 84145 PROCALCITONIN (PCT): CPT

## 2023-05-17 PROCEDURE — 97166 OT EVAL MOD COMPLEX 45 MIN: CPT

## 2023-05-17 PROCEDURE — 94640 AIRWAY INHALATION TREATMENT: CPT

## 2023-05-17 PROCEDURE — 36415 COLL VENOUS BLD VENIPUNCTURE: CPT

## 2023-05-17 PROCEDURE — 6370000000 HC RX 637 (ALT 250 FOR IP)

## 2023-05-17 PROCEDURE — 71045 X-RAY EXAM CHEST 1 VIEW: CPT

## 2023-05-17 PROCEDURE — 2060000000 HC ICU INTERMEDIATE R&B

## 2023-05-17 PROCEDURE — 6370000000 HC RX 637 (ALT 250 FOR IP): Performed by: NURSE PRACTITIONER

## 2023-05-17 PROCEDURE — 93005 ELECTROCARDIOGRAM TRACING: CPT | Performed by: STUDENT IN AN ORGANIZED HEALTH CARE EDUCATION/TRAINING PROGRAM

## 2023-05-17 PROCEDURE — 83880 ASSAY OF NATRIURETIC PEPTIDE: CPT

## 2023-05-17 PROCEDURE — 82330 ASSAY OF CALCIUM: CPT

## 2023-05-17 PROCEDURE — 99232 SBSQ HOSP IP/OBS MODERATE 35: CPT | Performed by: NURSE PRACTITIONER

## 2023-05-17 PROCEDURE — 85027 COMPLETE CBC AUTOMATED: CPT

## 2023-05-17 PROCEDURE — 6360000002 HC RX W HCPCS: Performed by: NURSE PRACTITIONER

## 2023-05-17 PROCEDURE — 84100 ASSAY OF PHOSPHORUS: CPT

## 2023-05-17 PROCEDURE — 2580000003 HC RX 258: Performed by: STUDENT IN AN ORGANIZED HEALTH CARE EDUCATION/TRAINING PROGRAM

## 2023-05-17 PROCEDURE — APPSS15 APP SPLIT SHARED TIME 0-15 MINUTES: Performed by: NURSE PRACTITIONER

## 2023-05-17 PROCEDURE — 99232 SBSQ HOSP IP/OBS MODERATE 35: CPT | Performed by: PHYSICAL MEDICINE & REHABILITATION

## 2023-05-17 PROCEDURE — 97162 PT EVAL MOD COMPLEX 30 MIN: CPT

## 2023-05-17 PROCEDURE — 80048 BASIC METABOLIC PNL TOTAL CA: CPT

## 2023-05-17 PROCEDURE — 86140 C-REACTIVE PROTEIN: CPT

## 2023-05-17 RX ORDER — FUROSEMIDE 10 MG/ML
20 INJECTION INTRAMUSCULAR; INTRAVENOUS ONCE
Status: COMPLETED | OUTPATIENT
Start: 2023-05-17 | End: 2023-05-17

## 2023-05-17 RX ORDER — MAGNESIUM SULFATE HEPTAHYDRATE 40 MG/ML
4000 INJECTION, SOLUTION INTRAVENOUS ONCE
Status: COMPLETED | OUTPATIENT
Start: 2023-05-17 | End: 2023-05-17

## 2023-05-17 RX ORDER — ENOXAPARIN SODIUM 100 MG/ML
30 INJECTION SUBCUTANEOUS 2 TIMES DAILY
Status: DISCONTINUED | OUTPATIENT
Start: 2023-05-17 | End: 2023-05-24 | Stop reason: HOSPADM

## 2023-05-17 RX ORDER — IPRATROPIUM BROMIDE AND ALBUTEROL SULFATE 2.5; .5 MG/3ML; MG/3ML
1 SOLUTION RESPIRATORY (INHALATION)
Status: DISCONTINUED | OUTPATIENT
Start: 2023-05-17 | End: 2023-05-24 | Stop reason: HOSPADM

## 2023-05-17 RX ORDER — IPRATROPIUM BROMIDE AND ALBUTEROL SULFATE 2.5; .5 MG/3ML; MG/3ML
1 SOLUTION RESPIRATORY (INHALATION) EVERY 4 HOURS PRN
Status: DISCONTINUED | OUTPATIENT
Start: 2023-05-17 | End: 2023-05-24 | Stop reason: HOSPADM

## 2023-05-17 RX ADMIN — SODIUM CHLORIDE: 9 INJECTION, SOLUTION INTRAVENOUS at 08:43

## 2023-05-17 RX ADMIN — METOPROLOL TARTRATE 12.5 MG: 25 TABLET ORAL at 22:01

## 2023-05-17 RX ADMIN — BUDESONIDE AND FORMOTEROL FUMARATE DIHYDRATE 2 PUFF: 80; 4.5 AEROSOL RESPIRATORY (INHALATION) at 09:02

## 2023-05-17 RX ADMIN — TAMSULOSIN HYDROCHLORIDE 0.4 MG: 0.4 CAPSULE ORAL at 08:53

## 2023-05-17 RX ADMIN — FAMOTIDINE 20 MG: 20 TABLET, FILM COATED ORAL at 22:01

## 2023-05-17 RX ADMIN — IPRATROPIUM BROMIDE AND ALBUTEROL SULFATE 1 AMPULE: .5; 3 SOLUTION RESPIRATORY (INHALATION) at 09:01

## 2023-05-17 RX ADMIN — METOPROLOL TARTRATE 12.5 MG: 25 TABLET ORAL at 08:52

## 2023-05-17 RX ADMIN — FOLIC ACID 1 MG: 1 TABLET ORAL at 08:53

## 2023-05-17 RX ADMIN — ACETAMINOPHEN 1000 MG: 500 TABLET ORAL at 23:09

## 2023-05-17 RX ADMIN — MAGNESIUM SULFATE HEPTAHYDRATE 4000 MG: 40 INJECTION, SOLUTION INTRAVENOUS at 12:49

## 2023-05-17 RX ADMIN — THIAMINE HYDROCHLORIDE 250 MG: 100 INJECTION, SOLUTION INTRAMUSCULAR; INTRAVENOUS at 08:45

## 2023-05-17 RX ADMIN — ENOXAPARIN SODIUM 30 MG: 30 INJECTION SUBCUTANEOUS at 11:25

## 2023-05-17 RX ADMIN — FAMOTIDINE 20 MG: 20 TABLET, FILM COATED ORAL at 08:53

## 2023-05-17 RX ADMIN — POLYETHYLENE GLYCOL 3350 17 G: 17 POWDER, FOR SOLUTION ORAL at 08:43

## 2023-05-17 RX ADMIN — SODIUM CHLORIDE, PRESERVATIVE FREE 10 ML: 5 INJECTION INTRAVENOUS at 22:06

## 2023-05-17 RX ADMIN — PHENOBARBITAL 32.4 MG: 32.4 TABLET ORAL at 22:01

## 2023-05-17 RX ADMIN — FUROSEMIDE 20 MG: 10 INJECTION, SOLUTION INTRAMUSCULAR; INTRAVENOUS at 22:01

## 2023-05-17 RX ADMIN — PHENOBARBITAL 32.4 MG: 32.4 TABLET ORAL at 10:09

## 2023-05-17 RX ADMIN — ACETAMINOPHEN 1000 MG: 500 TABLET ORAL at 13:05

## 2023-05-17 RX ADMIN — SODIUM CHLORIDE, PRESERVATIVE FREE 10 ML: 5 INJECTION INTRAVENOUS at 08:54

## 2023-05-17 RX ADMIN — SENNOSIDES 8.6 MG: 8.6 TABLET, COATED ORAL at 22:01

## 2023-05-17 RX ADMIN — ALCOHOL 1 TABLET: 70.47 GEL TOPICAL at 08:53

## 2023-05-17 RX ADMIN — IPRATROPIUM BROMIDE AND ALBUTEROL SULFATE 1 AMPULE: 2.5; .5 SOLUTION RESPIRATORY (INHALATION) at 15:45

## 2023-05-17 RX ADMIN — PHENOBARBITAL 32.4 MG: 32.4 TABLET ORAL at 13:05

## 2023-05-17 RX ADMIN — THIAMINE HYDROCHLORIDE 500 MG: 100 INJECTION, SOLUTION INTRAMUSCULAR; INTRAVENOUS at 01:14

## 2023-05-17 ASSESSMENT — PAIN - FUNCTIONAL ASSESSMENT: PAIN_FUNCTIONAL_ASSESSMENT: ACTIVITIES ARE NOT PREVENTED

## 2023-05-17 ASSESSMENT — PAIN SCALES - GENERAL
PAINLEVEL_OUTOF10: 0
PAINLEVEL_OUTOF10: 0
PAINLEVEL_OUTOF10: 1
PAINLEVEL_OUTOF10: 0
PAINLEVEL_OUTOF10: 0

## 2023-05-17 ASSESSMENT — PAIN DESCRIPTION - ORIENTATION: ORIENTATION: RIGHT;LEFT

## 2023-05-17 ASSESSMENT — PAIN DESCRIPTION - DESCRIPTORS: DESCRIPTORS: ACHING;DISCOMFORT

## 2023-05-17 ASSESSMENT — PAIN DESCRIPTION - LOCATION: LOCATION: BACK;LEG

## 2023-05-18 LAB
ANION GAP SERPL CALCULATED.3IONS-SCNC: 10 MMOL/L (ref 9–17)
BUN SERPL-MCNC: 2 MG/DL (ref 8–23)
CALCIUM SERPL-MCNC: 8.2 MG/DL (ref 8.6–10.4)
CHLORIDE SERPL-SCNC: 96 MMOL/L (ref 98–107)
CO2 SERPL-SCNC: 29 MMOL/L (ref 20–31)
CREAT SERPL-MCNC: 0.29 MG/DL (ref 0.7–1.2)
ERYTHROCYTE [DISTWIDTH] IN BLOOD BY AUTOMATED COUNT: 11.8 % (ref 11.8–14.4)
GFR SERPL CREATININE-BSD FRML MDRD: >60 ML/MIN/1.73M2
GLUCOSE SERPL-MCNC: 127 MG/DL (ref 70–99)
HCT VFR BLD AUTO: 30 % (ref 40.7–50.3)
HGB BLD-MCNC: 10 G/DL (ref 13–17)
MAGNESIUM SERPL-MCNC: 1.5 MG/DL (ref 1.6–2.6)
MCH RBC QN AUTO: 34.4 PG (ref 25.2–33.5)
MCHC RBC AUTO-ENTMCNC: 33.3 G/DL (ref 28.4–34.8)
MCV RBC AUTO: 103.1 FL (ref 82.6–102.9)
MRSA, DNA, NASAL: NEGATIVE
NRBC AUTOMATED: 0 PER 100 WBC
PHOSPHATE SERPL-MCNC: 3.3 MG/DL (ref 2.5–4.5)
PLATELET # BLD AUTO: 176 K/UL (ref 138–453)
PMV BLD AUTO: 9.3 FL (ref 8.1–13.5)
POTASSIUM SERPL-SCNC: 3.2 MMOL/L (ref 3.7–5.3)
RBC # BLD AUTO: 2.91 M/UL (ref 4.21–5.77)
SODIUM SERPL-SCNC: 135 MMOL/L (ref 135–144)
SPECIMEN DESCRIPTION: NORMAL
WBC OTHER # BLD: 5.3 K/UL (ref 3.5–11.3)

## 2023-05-18 PROCEDURE — 6370000000 HC RX 637 (ALT 250 FOR IP)

## 2023-05-18 PROCEDURE — 2580000003 HC RX 258: Performed by: NURSE PRACTITIONER

## 2023-05-18 PROCEDURE — 84100 ASSAY OF PHOSPHORUS: CPT

## 2023-05-18 PROCEDURE — 99232 SBSQ HOSP IP/OBS MODERATE 35: CPT | Performed by: NURSE PRACTITIONER

## 2023-05-18 PROCEDURE — 2580000003 HC RX 258: Performed by: STUDENT IN AN ORGANIZED HEALTH CARE EDUCATION/TRAINING PROGRAM

## 2023-05-18 PROCEDURE — 6370000000 HC RX 637 (ALT 250 FOR IP): Performed by: NURSE PRACTITIONER

## 2023-05-18 PROCEDURE — 6360000002 HC RX W HCPCS: Performed by: NURSE PRACTITIONER

## 2023-05-18 PROCEDURE — 99231 SBSQ HOSP IP/OBS SF/LOW 25: CPT | Performed by: SURGERY

## 2023-05-18 PROCEDURE — 6370000000 HC RX 637 (ALT 250 FOR IP): Performed by: STUDENT IN AN ORGANIZED HEALTH CARE EDUCATION/TRAINING PROGRAM

## 2023-05-18 PROCEDURE — 80048 BASIC METABOLIC PNL TOTAL CA: CPT

## 2023-05-18 PROCEDURE — 36415 COLL VENOUS BLD VENIPUNCTURE: CPT

## 2023-05-18 PROCEDURE — 2700000000 HC OXYGEN THERAPY PER DAY

## 2023-05-18 PROCEDURE — 94640 AIRWAY INHALATION TREATMENT: CPT

## 2023-05-18 PROCEDURE — 85027 COMPLETE CBC AUTOMATED: CPT

## 2023-05-18 PROCEDURE — 6360000002 HC RX W HCPCS: Performed by: STUDENT IN AN ORGANIZED HEALTH CARE EDUCATION/TRAINING PROGRAM

## 2023-05-18 PROCEDURE — 6360000002 HC RX W HCPCS: Performed by: INTERNAL MEDICINE

## 2023-05-18 PROCEDURE — 99232 SBSQ HOSP IP/OBS MODERATE 35: CPT | Performed by: INTERNAL MEDICINE

## 2023-05-18 PROCEDURE — 83735 ASSAY OF MAGNESIUM: CPT

## 2023-05-18 PROCEDURE — 2060000000 HC ICU INTERMEDIATE R&B

## 2023-05-18 RX ORDER — POTASSIUM CHLORIDE 20 MEQ/1
40 TABLET, EXTENDED RELEASE ORAL ONCE
Status: COMPLETED | OUTPATIENT
Start: 2023-05-18 | End: 2023-05-18

## 2023-05-18 RX ORDER — MAGNESIUM SULFATE IN WATER 40 MG/ML
2000 INJECTION, SOLUTION INTRAVENOUS ONCE
Status: COMPLETED | OUTPATIENT
Start: 2023-05-18 | End: 2023-05-18

## 2023-05-18 RX ORDER — POTASSIUM CHLORIDE 20 MEQ/1
20 TABLET, EXTENDED RELEASE ORAL 2 TIMES DAILY WITH MEALS
Status: COMPLETED | OUTPATIENT
Start: 2023-05-18 | End: 2023-05-18

## 2023-05-18 RX ORDER — FUROSEMIDE 10 MG/ML
20 INJECTION INTRAMUSCULAR; INTRAVENOUS ONCE
Status: COMPLETED | OUTPATIENT
Start: 2023-05-18 | End: 2023-05-18

## 2023-05-18 RX ADMIN — SENNOSIDES 8.6 MG: 8.6 TABLET, COATED ORAL at 21:18

## 2023-05-18 RX ADMIN — POTASSIUM CHLORIDE 40 MEQ: 1500 TABLET, EXTENDED RELEASE ORAL at 10:49

## 2023-05-18 RX ADMIN — ACETAMINOPHEN 1000 MG: 500 TABLET ORAL at 14:14

## 2023-05-18 RX ADMIN — FAMOTIDINE 20 MG: 20 TABLET, FILM COATED ORAL at 21:18

## 2023-05-18 RX ADMIN — BUDESONIDE AND FORMOTEROL FUMARATE DIHYDRATE 2 PUFF: 80; 4.5 AEROSOL RESPIRATORY (INHALATION) at 09:42

## 2023-05-18 RX ADMIN — PHENOBARBITAL 32.4 MG: 32.4 TABLET ORAL at 21:18

## 2023-05-18 RX ADMIN — FUROSEMIDE 20 MG: 10 INJECTION, SOLUTION INTRAMUSCULAR; INTRAVENOUS at 09:22

## 2023-05-18 RX ADMIN — POTASSIUM CHLORIDE 20 MEQ: 1500 TABLET, EXTENDED RELEASE ORAL at 17:39

## 2023-05-18 RX ADMIN — ENOXAPARIN SODIUM 30 MG: 30 INJECTION SUBCUTANEOUS at 21:18

## 2023-05-18 RX ADMIN — ALCOHOL 1 TABLET: 70.47 GEL TOPICAL at 08:43

## 2023-05-18 RX ADMIN — THIAMINE HYDROCHLORIDE 250 MG: 100 INJECTION, SOLUTION INTRAMUSCULAR; INTRAVENOUS at 08:48

## 2023-05-18 RX ADMIN — BUDESONIDE AND FORMOTEROL FUMARATE DIHYDRATE 2 PUFF: 80; 4.5 AEROSOL RESPIRATORY (INHALATION) at 20:43

## 2023-05-18 RX ADMIN — TAMSULOSIN HYDROCHLORIDE 0.4 MG: 0.4 CAPSULE ORAL at 08:43

## 2023-05-18 RX ADMIN — ENOXAPARIN SODIUM 30 MG: 30 INJECTION SUBCUTANEOUS at 08:44

## 2023-05-18 RX ADMIN — SODIUM CHLORIDE, PRESERVATIVE FREE 10 ML: 5 INJECTION INTRAVENOUS at 08:44

## 2023-05-18 RX ADMIN — IPRATROPIUM BROMIDE AND ALBUTEROL SULFATE 1 AMPULE: 2.5; .5 SOLUTION RESPIRATORY (INHALATION) at 20:43

## 2023-05-18 RX ADMIN — POTASSIUM CHLORIDE 20 MEQ: 1500 TABLET, EXTENDED RELEASE ORAL at 09:21

## 2023-05-18 RX ADMIN — IPRATROPIUM BROMIDE AND ALBUTEROL SULFATE 1 AMPULE: 2.5; .5 SOLUTION RESPIRATORY (INHALATION) at 14:50

## 2023-05-18 RX ADMIN — MAGNESIUM SULFATE HEPTAHYDRATE 2000 MG: 40 INJECTION, SOLUTION INTRAVENOUS at 10:51

## 2023-05-18 RX ADMIN — ACETAMINOPHEN 1000 MG: 500 TABLET ORAL at 21:18

## 2023-05-18 RX ADMIN — METOPROLOL TARTRATE 12.5 MG: 25 TABLET ORAL at 08:43

## 2023-05-18 RX ADMIN — PHENOBARBITAL 32.4 MG: 32.4 TABLET ORAL at 08:44

## 2023-05-18 RX ADMIN — ACETAMINOPHEN 1000 MG: 500 TABLET ORAL at 05:41

## 2023-05-18 RX ADMIN — IPRATROPIUM BROMIDE AND ALBUTEROL SULFATE 1 AMPULE: 2.5; .5 SOLUTION RESPIRATORY (INHALATION) at 09:42

## 2023-05-18 RX ADMIN — PHENOBARBITAL 32.4 MG: 32.4 TABLET ORAL at 14:14

## 2023-05-18 RX ADMIN — FOLIC ACID 1 MG: 1 TABLET ORAL at 08:43

## 2023-05-18 RX ADMIN — SODIUM CHLORIDE, PRESERVATIVE FREE 10 ML: 5 INJECTION INTRAVENOUS at 21:26

## 2023-05-18 RX ADMIN — FAMOTIDINE 20 MG: 20 TABLET, FILM COATED ORAL at 08:44

## 2023-05-18 RX ADMIN — POLYETHYLENE GLYCOL 3350 17 G: 17 POWDER, FOR SOLUTION ORAL at 08:42

## 2023-05-18 RX ADMIN — METOPROLOL TARTRATE 25 MG: 25 TABLET ORAL at 21:18

## 2023-05-18 ASSESSMENT — PAIN DESCRIPTION - LOCATION: LOCATION: GENERALIZED

## 2023-05-18 ASSESSMENT — PAIN DESCRIPTION - DESCRIPTORS: DESCRIPTORS: ACHING

## 2023-05-19 ENCOUNTER — APPOINTMENT (OUTPATIENT)
Dept: CT IMAGING | Age: 63
End: 2023-05-19
Payer: MEDICAID

## 2023-05-19 LAB
ANION GAP SERPL CALCULATED.3IONS-SCNC: 8 MMOL/L (ref 9–17)
BUN SERPL-MCNC: 4 MG/DL (ref 8–23)
CALCIUM SERPL-MCNC: 8.4 MG/DL (ref 8.6–10.4)
CHLORIDE SERPL-SCNC: 93 MMOL/L (ref 98–107)
CO2 SERPL-SCNC: 28 MMOL/L (ref 20–31)
CREAT SERPL-MCNC: 0.22 MG/DL (ref 0.7–1.2)
ERYTHROCYTE [DISTWIDTH] IN BLOOD BY AUTOMATED COUNT: 11.8 % (ref 11.8–14.4)
GFR SERPL CREATININE-BSD FRML MDRD: >60 ML/MIN/1.73M2
GLUCOSE BLD-MCNC: 110 MG/DL (ref 75–110)
GLUCOSE SERPL-MCNC: 116 MG/DL (ref 70–99)
HCT VFR BLD AUTO: 30.4 % (ref 40.7–50.3)
HGB BLD-MCNC: 10.2 G/DL (ref 13–17)
MAGNESIUM SERPL-MCNC: 1.8 MG/DL (ref 1.6–2.6)
MCH RBC QN AUTO: 35.1 PG (ref 25.2–33.5)
MCHC RBC AUTO-ENTMCNC: 33.6 G/DL (ref 28.4–34.8)
MCV RBC AUTO: 104.5 FL (ref 82.6–102.9)
NRBC AUTOMATED: 0 PER 100 WBC
PHOSPHATE SERPL-MCNC: 3.2 MG/DL (ref 2.5–4.5)
PLATELET # BLD AUTO: 215 K/UL (ref 138–453)
PMV BLD AUTO: 10.5 FL (ref 8.1–13.5)
POTASSIUM SERPL-SCNC: 4.4 MMOL/L (ref 3.7–5.3)
RBC # BLD AUTO: 2.91 M/UL (ref 4.21–5.77)
REASON FOR REJECTION: NORMAL
SODIUM SERPL-SCNC: 129 MMOL/L (ref 135–144)
SPECIMEN SOURCE: NORMAL
WBC OTHER # BLD: 4.1 K/UL (ref 3.5–11.3)
ZZ NTE CLEAN UP: ORDERED TEST: NORMAL

## 2023-05-19 PROCEDURE — 6360000002 HC RX W HCPCS: Performed by: STUDENT IN AN ORGANIZED HEALTH CARE EDUCATION/TRAINING PROGRAM

## 2023-05-19 PROCEDURE — 99232 SBSQ HOSP IP/OBS MODERATE 35: CPT | Performed by: SURGERY

## 2023-05-19 PROCEDURE — 2580000003 HC RX 258: Performed by: STUDENT IN AN ORGANIZED HEALTH CARE EDUCATION/TRAINING PROGRAM

## 2023-05-19 PROCEDURE — 85027 COMPLETE CBC AUTOMATED: CPT

## 2023-05-19 PROCEDURE — 94761 N-INVAS EAR/PLS OXIMETRY MLT: CPT

## 2023-05-19 PROCEDURE — 85025 COMPLETE CBC W/AUTO DIFF WBC: CPT

## 2023-05-19 PROCEDURE — 80048 BASIC METABOLIC PNL TOTAL CA: CPT

## 2023-05-19 PROCEDURE — 70450 CT HEAD/BRAIN W/O DYE: CPT

## 2023-05-19 PROCEDURE — 84100 ASSAY OF PHOSPHORUS: CPT

## 2023-05-19 PROCEDURE — 2580000003 HC RX 258

## 2023-05-19 PROCEDURE — 6370000000 HC RX 637 (ALT 250 FOR IP): Performed by: NURSE PRACTITIONER

## 2023-05-19 PROCEDURE — 6370000000 HC RX 637 (ALT 250 FOR IP)

## 2023-05-19 PROCEDURE — 6370000000 HC RX 637 (ALT 250 FOR IP): Performed by: STUDENT IN AN ORGANIZED HEALTH CARE EDUCATION/TRAINING PROGRAM

## 2023-05-19 PROCEDURE — 82947 ASSAY GLUCOSE BLOOD QUANT: CPT

## 2023-05-19 PROCEDURE — 97530 THERAPEUTIC ACTIVITIES: CPT

## 2023-05-19 PROCEDURE — 2580000003 HC RX 258: Performed by: NURSE PRACTITIONER

## 2023-05-19 PROCEDURE — 2700000000 HC OXYGEN THERAPY PER DAY

## 2023-05-19 PROCEDURE — 6360000002 HC RX W HCPCS: Performed by: NURSE PRACTITIONER

## 2023-05-19 PROCEDURE — 94640 AIRWAY INHALATION TREATMENT: CPT

## 2023-05-19 PROCEDURE — 36415 COLL VENOUS BLD VENIPUNCTURE: CPT

## 2023-05-19 PROCEDURE — 83735 ASSAY OF MAGNESIUM: CPT

## 2023-05-19 PROCEDURE — 2060000000 HC ICU INTERMEDIATE R&B

## 2023-05-19 RX ORDER — SODIUM CHLORIDE, SODIUM LACTATE, POTASSIUM CHLORIDE, AND CALCIUM CHLORIDE .6; .31; .03; .02 G/100ML; G/100ML; G/100ML; G/100ML
500 INJECTION, SOLUTION INTRAVENOUS ONCE
Status: COMPLETED | OUTPATIENT
Start: 2023-05-19 | End: 2023-05-19

## 2023-05-19 RX ORDER — SODIUM CHLORIDE, SODIUM LACTATE, POTASSIUM CHLORIDE, CALCIUM CHLORIDE 600; 310; 30; 20 MG/100ML; MG/100ML; MG/100ML; MG/100ML
INJECTION, SOLUTION INTRAVENOUS CONTINUOUS
Status: DISCONTINUED | OUTPATIENT
Start: 2023-05-19 | End: 2023-05-20

## 2023-05-19 RX ADMIN — ACETAMINOPHEN 1000 MG: 500 TABLET ORAL at 06:26

## 2023-05-19 RX ADMIN — METOPROLOL TARTRATE 25 MG: 25 TABLET ORAL at 09:01

## 2023-05-19 RX ADMIN — IPRATROPIUM BROMIDE AND ALBUTEROL SULFATE 1 AMPULE: 2.5; .5 SOLUTION RESPIRATORY (INHALATION) at 21:37

## 2023-05-19 RX ADMIN — SODIUM CHLORIDE, POTASSIUM CHLORIDE, SODIUM LACTATE AND CALCIUM CHLORIDE: 600; 310; 30; 20 INJECTION, SOLUTION INTRAVENOUS at 20:50

## 2023-05-19 RX ADMIN — FAMOTIDINE 20 MG: 20 TABLET, FILM COATED ORAL at 09:01

## 2023-05-19 RX ADMIN — TAMSULOSIN HYDROCHLORIDE 0.4 MG: 0.4 CAPSULE ORAL at 09:01

## 2023-05-19 RX ADMIN — ENOXAPARIN SODIUM 30 MG: 30 INJECTION SUBCUTANEOUS at 20:35

## 2023-05-19 RX ADMIN — ALCOHOL 1 TABLET: 70.47 GEL TOPICAL at 09:15

## 2023-05-19 RX ADMIN — ACETAMINOPHEN 1000 MG: 500 TABLET ORAL at 14:28

## 2023-05-19 RX ADMIN — SODIUM CHLORIDE, PRESERVATIVE FREE 10 ML: 5 INJECTION INTRAVENOUS at 09:01

## 2023-05-19 RX ADMIN — SODIUM CHLORIDE, PRESERVATIVE FREE 10 ML: 5 INJECTION INTRAVENOUS at 20:31

## 2023-05-19 RX ADMIN — POLYETHYLENE GLYCOL 3350 17 G: 17 POWDER, FOR SOLUTION ORAL at 14:29

## 2023-05-19 RX ADMIN — BUDESONIDE AND FORMOTEROL FUMARATE DIHYDRATE 2 PUFF: 80; 4.5 AEROSOL RESPIRATORY (INHALATION) at 21:38

## 2023-05-19 RX ADMIN — THIAMINE HYDROCHLORIDE 250 MG: 100 INJECTION, SOLUTION INTRAMUSCULAR; INTRAVENOUS at 09:06

## 2023-05-19 RX ADMIN — FOLIC ACID 1 MG: 1 TABLET ORAL at 09:01

## 2023-05-19 RX ADMIN — SODIUM CHLORIDE, POTASSIUM CHLORIDE, SODIUM LACTATE AND CALCIUM CHLORIDE 500 ML: 600; 310; 30; 20 INJECTION, SOLUTION INTRAVENOUS at 00:39

## 2023-05-19 RX ADMIN — IPRATROPIUM BROMIDE AND ALBUTEROL SULFATE 1 AMPULE: 2.5; .5 SOLUTION RESPIRATORY (INHALATION) at 11:29

## 2023-05-19 RX ADMIN — BUDESONIDE AND FORMOTEROL FUMARATE DIHYDRATE 2 PUFF: 80; 4.5 AEROSOL RESPIRATORY (INHALATION) at 11:30

## 2023-05-19 RX ADMIN — IPRATROPIUM BROMIDE AND ALBUTEROL SULFATE 1 AMPULE: 2.5; .5 SOLUTION RESPIRATORY (INHALATION) at 17:15

## 2023-05-20 ENCOUNTER — APPOINTMENT (OUTPATIENT)
Dept: GENERAL RADIOLOGY | Age: 63
End: 2023-05-20
Payer: MEDICAID

## 2023-05-20 PROBLEM — G93.40 ENCEPHALOPATHY ACUTE: Status: ACTIVE | Noted: 2023-05-20

## 2023-05-20 LAB
ANION GAP SERPL CALCULATED.3IONS-SCNC: 11 MMOL/L (ref 9–17)
ANION GAP SERPL CALCULATED.3IONS-SCNC: 7 MMOL/L (ref 9–17)
BASOPHILS # BLD: 0 K/UL (ref 0–0.2)
BASOPHILS NFR BLD: 0 % (ref 0–2)
BUN SERPL-MCNC: 4 MG/DL (ref 8–23)
BUN SERPL-MCNC: 6 MG/DL (ref 8–23)
CALCIUM SERPL-MCNC: 8.1 MG/DL (ref 8.6–10.4)
CALCIUM SERPL-MCNC: 8.3 MG/DL (ref 8.6–10.4)
CHLORIDE SERPL-SCNC: 89 MMOL/L (ref 98–107)
CHLORIDE SERPL-SCNC: 90 MMOL/L (ref 98–107)
CO2 SERPL-SCNC: 24 MMOL/L (ref 20–31)
CO2 SERPL-SCNC: 28 MMOL/L (ref 20–31)
CREAT SERPL-MCNC: 0.2 MG/DL (ref 0.7–1.2)
CREAT SERPL-MCNC: 0.24 MG/DL (ref 0.7–1.2)
EOSINOPHIL # BLD: 0 K/UL (ref 0–0.4)
EOSINOPHILS RELATIVE PERCENT: 0 % (ref 1–4)
ERYTHROCYTE [DISTWIDTH] IN BLOOD BY AUTOMATED COUNT: 11.9 % (ref 11.8–14.4)
GFR SERPL CREATININE-BSD FRML MDRD: >60 ML/MIN/1.73M2
GFR SERPL CREATININE-BSD FRML MDRD: >60 ML/MIN/1.73M2
GLUCOSE SERPL-MCNC: 102 MG/DL (ref 70–99)
GLUCOSE SERPL-MCNC: 93 MG/DL (ref 70–99)
HCT VFR BLD AUTO: 30.9 % (ref 40.7–50.3)
HGB BLD-MCNC: 10.4 G/DL (ref 13–17)
IMM GRANULOCYTES # BLD AUTO: 0 K/UL (ref 0–0.3)
IMM GRANULOCYTES NFR BLD: 0 %
LYMPHOCYTES # BLD: 25 % (ref 24–44)
LYMPHOCYTES NFR BLD: 1.3 K/UL (ref 1–4.8)
MAGNESIUM SERPL-MCNC: 1.2 MG/DL (ref 1.6–2.6)
MCH RBC QN AUTO: 34.6 PG (ref 25.2–33.5)
MCHC RBC AUTO-ENTMCNC: 33.7 G/DL (ref 28.4–34.8)
MCV RBC AUTO: 102.7 FL (ref 82.6–102.9)
MONOCYTES NFR BLD: 0.47 K/UL (ref 0.1–0.8)
MONOCYTES NFR BLD: 9 % (ref 1–7)
MORPHOLOGY: NORMAL
NEUTROPHILS NFR BLD: 66 % (ref 36–66)
NEUTS SEG NFR BLD: 3.43 K/UL (ref 1.8–7.7)
NRBC AUTOMATED: 0 PER 100 WBC
PLATELET # BLD AUTO: 181 K/UL (ref 138–453)
PMV BLD AUTO: 9 FL (ref 8.1–13.5)
POTASSIUM SERPL-SCNC: 4.5 MMOL/L (ref 3.7–5.3)
POTASSIUM SERPL-SCNC: 4.5 MMOL/L (ref 3.7–5.3)
RBC # BLD AUTO: 3.01 M/UL (ref 4.21–5.77)
SODIUM SERPL-SCNC: 124 MMOL/L (ref 135–144)
SODIUM SERPL-SCNC: 125 MMOL/L (ref 135–144)
WBC OTHER # BLD: 5.2 K/UL (ref 3.5–11.3)

## 2023-05-20 PROCEDURE — 80048 BASIC METABOLIC PNL TOTAL CA: CPT

## 2023-05-20 PROCEDURE — 2580000003 HC RX 258

## 2023-05-20 PROCEDURE — 6370000000 HC RX 637 (ALT 250 FOR IP)

## 2023-05-20 PROCEDURE — 36415 COLL VENOUS BLD VENIPUNCTURE: CPT

## 2023-05-20 PROCEDURE — 2580000003 HC RX 258: Performed by: STUDENT IN AN ORGANIZED HEALTH CARE EDUCATION/TRAINING PROGRAM

## 2023-05-20 PROCEDURE — 6370000000 HC RX 637 (ALT 250 FOR IP): Performed by: NURSE PRACTITIONER

## 2023-05-20 PROCEDURE — 99223 1ST HOSP IP/OBS HIGH 75: CPT | Performed by: PSYCHIATRY & NEUROLOGY

## 2023-05-20 PROCEDURE — 6360000002 HC RX W HCPCS: Performed by: STUDENT IN AN ORGANIZED HEALTH CARE EDUCATION/TRAINING PROGRAM

## 2023-05-20 PROCEDURE — 2580000003 HC RX 258: Performed by: NURSE PRACTITIONER

## 2023-05-20 PROCEDURE — 94640 AIRWAY INHALATION TREATMENT: CPT

## 2023-05-20 PROCEDURE — 6360000002 HC RX W HCPCS: Performed by: NURSE PRACTITIONER

## 2023-05-20 PROCEDURE — 83735 ASSAY OF MAGNESIUM: CPT

## 2023-05-20 PROCEDURE — 6370000000 HC RX 637 (ALT 250 FOR IP): Performed by: STUDENT IN AN ORGANIZED HEALTH CARE EDUCATION/TRAINING PROGRAM

## 2023-05-20 PROCEDURE — 71045 X-RAY EXAM CHEST 1 VIEW: CPT

## 2023-05-20 PROCEDURE — 2500000003 HC RX 250 WO HCPCS: Performed by: STUDENT IN AN ORGANIZED HEALTH CARE EDUCATION/TRAINING PROGRAM

## 2023-05-20 PROCEDURE — 2700000000 HC OXYGEN THERAPY PER DAY

## 2023-05-20 PROCEDURE — 2060000000 HC ICU INTERMEDIATE R&B

## 2023-05-20 PROCEDURE — 94761 N-INVAS EAR/PLS OXIMETRY MLT: CPT

## 2023-05-20 PROCEDURE — 99232 SBSQ HOSP IP/OBS MODERATE 35: CPT | Performed by: SURGERY

## 2023-05-20 RX ORDER — GUAIFENESIN 600 MG/1
600 TABLET, EXTENDED RELEASE ORAL 2 TIMES DAILY
Status: DISCONTINUED | OUTPATIENT
Start: 2023-05-20 | End: 2023-05-24 | Stop reason: HOSPADM

## 2023-05-20 RX ORDER — SODIUM CHLORIDE 9 MG/ML
INJECTION, SOLUTION INTRAVENOUS CONTINUOUS
Status: DISCONTINUED | OUTPATIENT
Start: 2023-05-20 | End: 2023-05-20

## 2023-05-20 RX ORDER — DEXTROSE, SODIUM CHLORIDE, AND POTASSIUM CHLORIDE 5; .9; .15 G/100ML; G/100ML; G/100ML
INJECTION INTRAVENOUS CONTINUOUS
Status: DISCONTINUED | OUTPATIENT
Start: 2023-05-20 | End: 2023-05-21

## 2023-05-20 RX ORDER — SODIUM CHLORIDE 1000 MG
2 TABLET, SOLUBLE MISCELLANEOUS
Status: DISCONTINUED | OUTPATIENT
Start: 2023-05-20 | End: 2023-05-22

## 2023-05-20 RX ORDER — CHOLECALCIFEROL (VITAMIN D3) 125 MCG
5 CAPSULE ORAL NIGHTLY
Status: DISCONTINUED | OUTPATIENT
Start: 2023-05-20 | End: 2023-05-24 | Stop reason: HOSPADM

## 2023-05-20 RX ORDER — SODIUM CHLORIDE 1000 MG
1 TABLET, SOLUBLE MISCELLANEOUS
Status: DISCONTINUED | OUTPATIENT
Start: 2023-05-20 | End: 2023-05-20

## 2023-05-20 RX ADMIN — SODIUM CHLORIDE, PRESERVATIVE FREE 10 ML: 5 INJECTION INTRAVENOUS at 10:54

## 2023-05-20 RX ADMIN — BUDESONIDE AND FORMOTEROL FUMARATE DIHYDRATE 2 PUFF: 80; 4.5 AEROSOL RESPIRATORY (INHALATION) at 09:40

## 2023-05-20 RX ADMIN — METOPROLOL TARTRATE 25 MG: 25 TABLET ORAL at 10:53

## 2023-05-20 RX ADMIN — SODIUM CHLORIDE: 9 INJECTION, SOLUTION INTRAVENOUS at 00:30

## 2023-05-20 RX ADMIN — GUAIFENESIN 600 MG: 600 TABLET, EXTENDED RELEASE ORAL at 10:57

## 2023-05-20 RX ADMIN — SODIUM CHLORIDE TAB 1 GM 2 G: 1 TAB at 18:25

## 2023-05-20 RX ADMIN — IPRATROPIUM BROMIDE AND ALBUTEROL SULFATE 1 AMPULE: 2.5; .5 SOLUTION RESPIRATORY (INHALATION) at 09:39

## 2023-05-20 RX ADMIN — POTASSIUM CHLORIDE, DEXTROSE MONOHYDRATE AND SODIUM CHLORIDE: 150; 5; 900 INJECTION, SOLUTION INTRAVENOUS at 15:59

## 2023-05-20 RX ADMIN — IPRATROPIUM BROMIDE AND ALBUTEROL SULFATE 1 AMPULE: 2.5; .5 SOLUTION RESPIRATORY (INHALATION) at 20:10

## 2023-05-20 RX ADMIN — ACETAMINOPHEN 1000 MG: 500 TABLET ORAL at 16:00

## 2023-05-20 RX ADMIN — BUDESONIDE AND FORMOTEROL FUMARATE DIHYDRATE 2 PUFF: 80; 4.5 AEROSOL RESPIRATORY (INHALATION) at 20:10

## 2023-05-20 RX ADMIN — THIAMINE HYDROCHLORIDE 250 MG: 100 INJECTION, SOLUTION INTRAMUSCULAR; INTRAVENOUS at 10:31

## 2023-05-20 RX ADMIN — TAMSULOSIN HYDROCHLORIDE 0.4 MG: 0.4 CAPSULE ORAL at 10:53

## 2023-05-20 RX ADMIN — FOLIC ACID 1 MG: 1 TABLET ORAL at 10:56

## 2023-05-20 RX ADMIN — ALCOHOL 1 TABLET: 70.47 GEL TOPICAL at 10:59

## 2023-05-20 RX ADMIN — SODIUM CHLORIDE TAB 1 GM 2 G: 1 TAB at 10:53

## 2023-05-20 RX ADMIN — ENOXAPARIN SODIUM 30 MG: 30 INJECTION SUBCUTANEOUS at 21:04

## 2023-05-20 RX ADMIN — FAMOTIDINE 20 MG: 20 TABLET, FILM COATED ORAL at 10:55

## 2023-05-20 RX ADMIN — POLYETHYLENE GLYCOL 3350 17 G: 17 POWDER, FOR SOLUTION ORAL at 10:51

## 2023-05-21 PROBLEM — F10.10 ALCOHOL ABUSE: Status: ACTIVE | Noted: 2023-05-21

## 2023-05-21 PROBLEM — E87.1 HYPONATREMIA: Status: ACTIVE | Noted: 2023-05-21

## 2023-05-21 LAB
ANION GAP SERPL CALCULATED.3IONS-SCNC: 11 MMOL/L (ref 9–17)
BASOPHILS # BLD: 0.03 K/UL (ref 0–0.2)
BASOPHILS NFR BLD: 1 % (ref 0–2)
BUN SERPL-MCNC: 3 MG/DL (ref 8–23)
CALCIUM SERPL-MCNC: 8.3 MG/DL (ref 8.6–10.4)
CHLORIDE SERPL-SCNC: 86 MMOL/L (ref 98–107)
CO2 SERPL-SCNC: 23 MMOL/L (ref 20–31)
CREAT SERPL-MCNC: 0.23 MG/DL (ref 0.7–1.2)
EOSINOPHIL # BLD: 0.04 K/UL (ref 0–0.44)
EOSINOPHILS RELATIVE PERCENT: 1 % (ref 1–4)
ERYTHROCYTE [DISTWIDTH] IN BLOOD BY AUTOMATED COUNT: 11.9 % (ref 11.8–14.4)
GFR SERPL CREATININE-BSD FRML MDRD: >60 ML/MIN/1.73M2
GLUCOSE SERPL-MCNC: 108 MG/DL (ref 70–99)
HCT VFR BLD AUTO: 32.3 % (ref 40.7–50.3)
HGB BLD-MCNC: 11.3 G/DL (ref 13–17)
IMM GRANULOCYTES # BLD AUTO: <0.03 K/UL (ref 0–0.3)
IMM GRANULOCYTES NFR BLD: 1 %
LYMPHOCYTES # BLD: 26 % (ref 24–43)
LYMPHOCYTES NFR BLD: 1.14 K/UL (ref 1.1–3.7)
MCH RBC QN AUTO: 34.2 PG (ref 25.2–33.5)
MCHC RBC AUTO-ENTMCNC: 35 G/DL (ref 28.4–34.8)
MCV RBC AUTO: 97.9 FL (ref 82.6–102.9)
MONOCYTES NFR BLD: 0.58 K/UL (ref 0.1–1.2)
MONOCYTES NFR BLD: 13 % (ref 3–12)
NEUTROPHILS NFR BLD: 58 % (ref 36–65)
NEUTS SEG NFR BLD: 2.58 K/UL (ref 1.5–8.1)
NRBC AUTOMATED: 0 PER 100 WBC
OSMOLALITY SERPL: 249 MOSM/KG (ref 275–295)
PLATELET # BLD AUTO: 179 K/UL (ref 138–453)
PMV BLD AUTO: 8.9 FL (ref 8.1–13.5)
POTASSIUM SERPL-SCNC: 3.9 MMOL/L (ref 3.7–5.3)
RBC # BLD AUTO: 3.3 M/UL (ref 4.21–5.77)
SODIUM SERPL-SCNC: 117 MMOL/L (ref 135–144)
SODIUM SERPL-SCNC: 119 MMOL/L (ref 135–144)
SODIUM SERPL-SCNC: 120 MMOL/L (ref 135–144)
SODIUM SERPL-SCNC: 121 MMOL/L (ref 135–144)
WBC OTHER # BLD: 4.4 K/UL (ref 3.5–11.3)

## 2023-05-21 PROCEDURE — 6370000000 HC RX 637 (ALT 250 FOR IP): Performed by: NURSE PRACTITIONER

## 2023-05-21 PROCEDURE — 6360000002 HC RX W HCPCS: Performed by: STUDENT IN AN ORGANIZED HEALTH CARE EDUCATION/TRAINING PROGRAM

## 2023-05-21 PROCEDURE — 6370000000 HC RX 637 (ALT 250 FOR IP): Performed by: INTERNAL MEDICINE

## 2023-05-21 PROCEDURE — 6360000002 HC RX W HCPCS: Performed by: NURSE PRACTITIONER

## 2023-05-21 PROCEDURE — 2580000003 HC RX 258: Performed by: STUDENT IN AN ORGANIZED HEALTH CARE EDUCATION/TRAINING PROGRAM

## 2023-05-21 PROCEDURE — 6370000000 HC RX 637 (ALT 250 FOR IP): Performed by: STUDENT IN AN ORGANIZED HEALTH CARE EDUCATION/TRAINING PROGRAM

## 2023-05-21 PROCEDURE — 2060000000 HC ICU INTERMEDIATE R&B

## 2023-05-21 PROCEDURE — 2700000000 HC OXYGEN THERAPY PER DAY

## 2023-05-21 PROCEDURE — 85025 COMPLETE CBC W/AUTO DIFF WBC: CPT

## 2023-05-21 PROCEDURE — 6370000000 HC RX 637 (ALT 250 FOR IP)

## 2023-05-21 PROCEDURE — 94761 N-INVAS EAR/PLS OXIMETRY MLT: CPT

## 2023-05-21 PROCEDURE — 2500000003 HC RX 250 WO HCPCS: Performed by: STUDENT IN AN ORGANIZED HEALTH CARE EDUCATION/TRAINING PROGRAM

## 2023-05-21 PROCEDURE — 99233 SBSQ HOSP IP/OBS HIGH 50: CPT | Performed by: PSYCHIATRY & NEUROLOGY

## 2023-05-21 PROCEDURE — 99223 1ST HOSP IP/OBS HIGH 75: CPT | Performed by: INTERNAL MEDICINE

## 2023-05-21 PROCEDURE — 2580000003 HC RX 258: Performed by: NURSE PRACTITIONER

## 2023-05-21 PROCEDURE — 80048 BASIC METABOLIC PNL TOTAL CA: CPT

## 2023-05-21 PROCEDURE — 99232 SBSQ HOSP IP/OBS MODERATE 35: CPT | Performed by: SURGERY

## 2023-05-21 PROCEDURE — 36415 COLL VENOUS BLD VENIPUNCTURE: CPT

## 2023-05-21 PROCEDURE — 94640 AIRWAY INHALATION TREATMENT: CPT

## 2023-05-21 PROCEDURE — 83930 ASSAY OF BLOOD OSMOLALITY: CPT

## 2023-05-21 PROCEDURE — 6360000002 HC RX W HCPCS: Performed by: INTERNAL MEDICINE

## 2023-05-21 PROCEDURE — 84295 ASSAY OF SERUM SODIUM: CPT

## 2023-05-21 RX ORDER — FUROSEMIDE 10 MG/ML
20 INJECTION INTRAMUSCULAR; INTRAVENOUS ONCE
Status: COMPLETED | OUTPATIENT
Start: 2023-05-21 | End: 2023-05-21

## 2023-05-21 RX ADMIN — GUAIFENESIN 600 MG: 600 TABLET, EXTENDED RELEASE ORAL at 20:45

## 2023-05-21 RX ADMIN — ALCOHOL 1 TABLET: 70.47 GEL TOPICAL at 09:25

## 2023-05-21 RX ADMIN — SODIUM CHLORIDE TAB 1 GM 2 G: 1 TAB at 12:42

## 2023-05-21 RX ADMIN — IPRATROPIUM BROMIDE AND ALBUTEROL SULFATE 1 AMPULE: 2.5; .5 SOLUTION RESPIRATORY (INHALATION) at 12:52

## 2023-05-21 RX ADMIN — ENOXAPARIN SODIUM 30 MG: 30 INJECTION SUBCUTANEOUS at 09:24

## 2023-05-21 RX ADMIN — IPRATROPIUM BROMIDE AND ALBUTEROL SULFATE 1 AMPULE: 2.5; .5 SOLUTION RESPIRATORY (INHALATION) at 15:40

## 2023-05-21 RX ADMIN — BUDESONIDE AND FORMOTEROL FUMARATE DIHYDRATE 2 PUFF: 80; 4.5 AEROSOL RESPIRATORY (INHALATION) at 08:58

## 2023-05-21 RX ADMIN — SODIUM CHLORIDE TAB 1 GM 2 G: 1 TAB at 09:24

## 2023-05-21 RX ADMIN — TAMSULOSIN HYDROCHLORIDE 0.4 MG: 0.4 CAPSULE ORAL at 09:25

## 2023-05-21 RX ADMIN — FUROSEMIDE 20 MG: 10 INJECTION, SOLUTION INTRAMUSCULAR; INTRAVENOUS at 13:09

## 2023-05-21 RX ADMIN — GUAIFENESIN 600 MG: 600 TABLET, EXTENDED RELEASE ORAL at 09:25

## 2023-05-21 RX ADMIN — IPRATROPIUM BROMIDE AND ALBUTEROL SULFATE 1 AMPULE: 2.5; .5 SOLUTION RESPIRATORY (INHALATION) at 08:58

## 2023-05-21 RX ADMIN — METOPROLOL TARTRATE 25 MG: 25 TABLET ORAL at 09:25

## 2023-05-21 RX ADMIN — SODIUM CHLORIDE, PRESERVATIVE FREE 10 ML: 5 INJECTION INTRAVENOUS at 20:38

## 2023-05-21 RX ADMIN — THIAMINE HYDROCHLORIDE 250 MG: 100 INJECTION, SOLUTION INTRAMUSCULAR; INTRAVENOUS at 09:23

## 2023-05-21 RX ADMIN — FAMOTIDINE 20 MG: 20 TABLET, FILM COATED ORAL at 09:25

## 2023-05-21 RX ADMIN — Medication 7.5 MG: at 23:19

## 2023-05-21 RX ADMIN — SODIUM CHLORIDE TAB 1 GM 2 G: 1 TAB at 16:41

## 2023-05-21 RX ADMIN — ENOXAPARIN SODIUM 30 MG: 30 INJECTION SUBCUTANEOUS at 20:38

## 2023-05-21 RX ADMIN — METOPROLOL TARTRATE 25 MG: 25 TABLET ORAL at 20:38

## 2023-05-21 RX ADMIN — ACETAMINOPHEN 1000 MG: 500 TABLET ORAL at 13:10

## 2023-05-21 RX ADMIN — FOLIC ACID 1 MG: 1 TABLET ORAL at 09:25

## 2023-05-21 RX ADMIN — POTASSIUM CHLORIDE, DEXTROSE MONOHYDRATE AND SODIUM CHLORIDE: 150; 5; 900 INJECTION, SOLUTION INTRAVENOUS at 14:12

## 2023-05-21 ASSESSMENT — PAIN SCALES - GENERAL
PAINLEVEL_OUTOF10: 4
PAINLEVEL_OUTOF10: 6
PAINLEVEL_OUTOF10: 2

## 2023-05-21 ASSESSMENT — PAIN DESCRIPTION - LOCATION: LOCATION: HEAD

## 2023-05-21 ASSESSMENT — PAIN DESCRIPTION - DESCRIPTORS: DESCRIPTORS: ACHING

## 2023-05-22 ENCOUNTER — APPOINTMENT (OUTPATIENT)
Dept: GENERAL RADIOLOGY | Age: 63
End: 2023-05-22
Payer: MEDICAID

## 2023-05-22 LAB
ANION GAP SERPL CALCULATED.3IONS-SCNC: 10 MMOL/L (ref 9–17)
ANION GAP SERPL CALCULATED.3IONS-SCNC: 11 MMOL/L (ref 9–17)
ANION GAP SERPL CALCULATED.3IONS-SCNC: 14 MMOL/L (ref 9–17)
BASOPHILS # BLD: 0.03 K/UL (ref 0–0.2)
BASOPHILS NFR BLD: 1 % (ref 0–2)
BUN SERPL-MCNC: 5 MG/DL (ref 8–23)
CALCIUM SERPL-MCNC: 8.1 MG/DL (ref 8.6–10.4)
CHLORIDE SERPL-SCNC: 85 MMOL/L (ref 98–107)
CHLORIDE SERPL-SCNC: 91 MMOL/L (ref 98–107)
CHLORIDE SERPL-SCNC: 94 MMOL/L (ref 98–107)
CO2 SERPL-SCNC: 21 MMOL/L (ref 20–31)
CO2 SERPL-SCNC: 24 MMOL/L (ref 20–31)
CO2 SERPL-SCNC: 25 MMOL/L (ref 20–31)
CREAT SERPL-MCNC: 0.28 MG/DL (ref 0.7–1.2)
EOSINOPHIL # BLD: 0.04 K/UL (ref 0–0.44)
EOSINOPHILS RELATIVE PERCENT: 1 % (ref 1–4)
ERYTHROCYTE [DISTWIDTH] IN BLOOD BY AUTOMATED COUNT: 11.9 % (ref 11.8–14.4)
GFR SERPL CREATININE-BSD FRML MDRD: >60 ML/MIN/1.73M2
GLUCOSE SERPL-MCNC: 89 MG/DL (ref 70–99)
HCT VFR BLD AUTO: 35 % (ref 40.7–50.3)
HGB BLD-MCNC: 12.3 G/DL (ref 13–17)
IMM GRANULOCYTES # BLD AUTO: <0.03 K/UL (ref 0–0.3)
IMM GRANULOCYTES NFR BLD: 0 %
LYMPHOCYTES # BLD: 28 % (ref 24–43)
LYMPHOCYTES NFR BLD: 1.28 K/UL (ref 1.1–3.7)
MAGNESIUM SERPL-MCNC: 1.1 MG/DL (ref 1.6–2.6)
MCH RBC QN AUTO: 34.3 PG (ref 25.2–33.5)
MCHC RBC AUTO-ENTMCNC: 35.1 G/DL (ref 28.4–34.8)
MCV RBC AUTO: 97.5 FL (ref 82.6–102.9)
MONOCYTES NFR BLD: 0.62 K/UL (ref 0.1–1.2)
MONOCYTES NFR BLD: 14 % (ref 3–12)
NEUTROPHILS NFR BLD: 56 % (ref 36–65)
NEUTS SEG NFR BLD: 2.54 K/UL (ref 1.5–8.1)
NRBC AUTOMATED: 0 PER 100 WBC
OSMOLALITY UR: 432 MOSM/KG (ref 80–1300)
PLATELET # BLD AUTO: 197 K/UL (ref 138–453)
PMV BLD AUTO: 9 FL (ref 8.1–13.5)
POTASSIUM SERPL-SCNC: 3.5 MMOL/L (ref 3.7–5.3)
POTASSIUM SERPL-SCNC: 3.6 MMOL/L (ref 3.7–5.3)
POTASSIUM SERPL-SCNC: 3.8 MMOL/L (ref 3.7–5.3)
RBC # BLD AUTO: 3.59 M/UL (ref 4.21–5.77)
SODIUM SERPL-SCNC: 120 MMOL/L (ref 135–144)
SODIUM SERPL-SCNC: 124 MMOL/L (ref 135–144)
SODIUM SERPL-SCNC: 125 MMOL/L (ref 135–144)
SODIUM SERPL-SCNC: 126 MMOL/L (ref 135–144)
SODIUM SERPL-SCNC: 129 MMOL/L (ref 135–144)
SODIUM UR-SCNC: 137 MMOL/L
WBC OTHER # BLD: 4.5 K/UL (ref 3.5–11.3)

## 2023-05-22 PROCEDURE — 94640 AIRWAY INHALATION TREATMENT: CPT

## 2023-05-22 PROCEDURE — 6370000000 HC RX 637 (ALT 250 FOR IP): Performed by: STUDENT IN AN ORGANIZED HEALTH CARE EDUCATION/TRAINING PROGRAM

## 2023-05-22 PROCEDURE — 83735 ASSAY OF MAGNESIUM: CPT

## 2023-05-22 PROCEDURE — 6370000000 HC RX 637 (ALT 250 FOR IP)

## 2023-05-22 PROCEDURE — 74230 X-RAY XM SWLNG FUNCJ C+: CPT

## 2023-05-22 PROCEDURE — 2060000000 HC ICU INTERMEDIATE R&B

## 2023-05-22 PROCEDURE — 6370000000 HC RX 637 (ALT 250 FOR IP): Performed by: NURSE PRACTITIONER

## 2023-05-22 PROCEDURE — 99233 SBSQ HOSP IP/OBS HIGH 50: CPT | Performed by: PSYCHIATRY & NEUROLOGY

## 2023-05-22 PROCEDURE — 94761 N-INVAS EAR/PLS OXIMETRY MLT: CPT

## 2023-05-22 PROCEDURE — 80051 ELECTROLYTE PANEL: CPT

## 2023-05-22 PROCEDURE — 97110 THERAPEUTIC EXERCISES: CPT

## 2023-05-22 PROCEDURE — 85025 COMPLETE CBC W/AUTO DIFF WBC: CPT

## 2023-05-22 PROCEDURE — 84295 ASSAY OF SERUM SODIUM: CPT

## 2023-05-22 PROCEDURE — 99232 SBSQ HOSP IP/OBS MODERATE 35: CPT | Performed by: INTERNAL MEDICINE

## 2023-05-22 PROCEDURE — 83935 ASSAY OF URINE OSMOLALITY: CPT

## 2023-05-22 PROCEDURE — 92611 MOTION FLUOROSCOPY/SWALLOW: CPT

## 2023-05-22 PROCEDURE — 84300 ASSAY OF URINE SODIUM: CPT

## 2023-05-22 PROCEDURE — 2580000003 HC RX 258: Performed by: STUDENT IN AN ORGANIZED HEALTH CARE EDUCATION/TRAINING PROGRAM

## 2023-05-22 PROCEDURE — 97530 THERAPEUTIC ACTIVITIES: CPT

## 2023-05-22 PROCEDURE — 6360000002 HC RX W HCPCS: Performed by: NURSE PRACTITIONER

## 2023-05-22 PROCEDURE — 99233 SBSQ HOSP IP/OBS HIGH 50: CPT | Performed by: SURGERY

## 2023-05-22 PROCEDURE — 6370000000 HC RX 637 (ALT 250 FOR IP): Performed by: INTERNAL MEDICINE

## 2023-05-22 PROCEDURE — 36415 COLL VENOUS BLD VENIPUNCTURE: CPT

## 2023-05-22 PROCEDURE — 80048 BASIC METABOLIC PNL TOTAL CA: CPT

## 2023-05-22 PROCEDURE — 99232 SBSQ HOSP IP/OBS MODERATE 35: CPT | Performed by: PHYSICAL MEDICINE & REHABILITATION

## 2023-05-22 PROCEDURE — 6360000002 HC RX W HCPCS: Performed by: STUDENT IN AN ORGANIZED HEALTH CARE EDUCATION/TRAINING PROGRAM

## 2023-05-22 PROCEDURE — 93005 ELECTROCARDIOGRAM TRACING: CPT

## 2023-05-22 RX ORDER — QUETIAPINE FUMARATE 25 MG/1
12.5 TABLET, FILM COATED ORAL NIGHTLY
Status: DISCONTINUED | OUTPATIENT
Start: 2023-05-22 | End: 2023-05-22

## 2023-05-22 RX ORDER — QUETIAPINE FUMARATE 25 MG/1
25 TABLET, FILM COATED ORAL NIGHTLY
Status: DISCONTINUED | OUTPATIENT
Start: 2023-05-23 | End: 2023-05-22

## 2023-05-22 RX ORDER — MAGNESIUM SULFATE HEPTAHYDRATE 40 MG/ML
4000 INJECTION, SOLUTION INTRAVENOUS ONCE
Status: COMPLETED | OUTPATIENT
Start: 2023-05-22 | End: 2023-05-22

## 2023-05-22 RX ORDER — POTASSIUM CHLORIDE 20 MEQ/1
20 TABLET, EXTENDED RELEASE ORAL ONCE
Status: COMPLETED | OUTPATIENT
Start: 2023-05-22 | End: 2023-05-22

## 2023-05-22 RX ADMIN — ALCOHOL 1 TABLET: 70.47 GEL TOPICAL at 09:00

## 2023-05-22 RX ADMIN — SODIUM CHLORIDE TAB 1 GM 2 G: 1 TAB at 12:07

## 2023-05-22 RX ADMIN — POTASSIUM BICARBONATE 20 MEQ: 782 TABLET, EFFERVESCENT ORAL at 20:36

## 2023-05-22 RX ADMIN — IPRATROPIUM BROMIDE AND ALBUTEROL SULFATE 1 AMPULE: 2.5; .5 SOLUTION RESPIRATORY (INHALATION) at 08:10

## 2023-05-22 RX ADMIN — ENOXAPARIN SODIUM 30 MG: 30 INJECTION SUBCUTANEOUS at 20:35

## 2023-05-22 RX ADMIN — IPRATROPIUM BROMIDE AND ALBUTEROL SULFATE 1 AMPULE: 2.5; .5 SOLUTION RESPIRATORY (INHALATION) at 12:14

## 2023-05-22 RX ADMIN — TAMSULOSIN HYDROCHLORIDE 0.4 MG: 0.4 CAPSULE ORAL at 09:00

## 2023-05-22 RX ADMIN — FAMOTIDINE 20 MG: 20 TABLET, FILM COATED ORAL at 20:35

## 2023-05-22 RX ADMIN — Medication 5 MG: at 20:58

## 2023-05-22 RX ADMIN — ACETAMINOPHEN 1000 MG: 500 TABLET ORAL at 20:35

## 2023-05-22 RX ADMIN — ACETAMINOPHEN 1000 MG: 500 TABLET ORAL at 12:09

## 2023-05-22 RX ADMIN — GUAIFENESIN 600 MG: 600 TABLET, EXTENDED RELEASE ORAL at 09:00

## 2023-05-22 RX ADMIN — MAGNESIUM SULFATE HEPTAHYDRATE 4000 MG: 40 INJECTION, SOLUTION INTRAVENOUS at 14:42

## 2023-05-22 RX ADMIN — METOPROLOL TARTRATE 25 MG: 25 TABLET ORAL at 09:00

## 2023-05-22 RX ADMIN — FOLIC ACID 1 MG: 1 TABLET ORAL at 09:00

## 2023-05-22 RX ADMIN — Medication 100 MG: at 09:00

## 2023-05-22 RX ADMIN — Medication 7.5 MG: at 14:09

## 2023-05-22 RX ADMIN — POTASSIUM BICARBONATE 20 MEQ: 782 TABLET, EFFERVESCENT ORAL at 16:23

## 2023-05-22 RX ADMIN — FAMOTIDINE 20 MG: 20 TABLET, FILM COATED ORAL at 09:00

## 2023-05-22 RX ADMIN — ENOXAPARIN SODIUM 30 MG: 30 INJECTION SUBCUTANEOUS at 09:00

## 2023-05-22 RX ADMIN — IPRATROPIUM BROMIDE AND ALBUTEROL SULFATE 1 AMPULE: 2.5; .5 SOLUTION RESPIRATORY (INHALATION) at 15:28

## 2023-05-22 RX ADMIN — BUDESONIDE AND FORMOTEROL FUMARATE DIHYDRATE 2 PUFF: 80; 4.5 AEROSOL RESPIRATORY (INHALATION) at 08:10

## 2023-05-22 RX ADMIN — QUETIAPINE FUMARATE 12.5 MG: 25 TABLET ORAL at 04:42

## 2023-05-22 RX ADMIN — SODIUM CHLORIDE, PRESERVATIVE FREE 10 ML: 5 INJECTION INTRAVENOUS at 09:00

## 2023-05-22 RX ADMIN — GUAIFENESIN 600 MG: 600 TABLET, EXTENDED RELEASE ORAL at 20:35

## 2023-05-22 RX ADMIN — POTASSIUM CHLORIDE 20 MEQ: 1500 TABLET, EXTENDED RELEASE ORAL at 14:09

## 2023-05-22 RX ADMIN — SODIUM CHLORIDE TAB 1 GM 2 G: 1 TAB at 09:00

## 2023-05-22 ASSESSMENT — PAIN SCALES - GENERAL
PAINLEVEL_OUTOF10: 0
PAINLEVEL_OUTOF10: 1
PAINLEVEL_OUTOF10: 0
PAINLEVEL_OUTOF10: 1
PAINLEVEL_OUTOF10: 0

## 2023-05-22 ASSESSMENT — PAIN DESCRIPTION - ORIENTATION: ORIENTATION: ANTERIOR;POSTERIOR

## 2023-05-22 ASSESSMENT — PAIN DESCRIPTION - DESCRIPTORS: DESCRIPTORS: ACHING

## 2023-05-22 ASSESSMENT — PAIN DESCRIPTION - LOCATION: LOCATION: HEAD

## 2023-05-22 ASSESSMENT — PAIN - FUNCTIONAL ASSESSMENT: PAIN_FUNCTIONAL_ASSESSMENT: ACTIVITIES ARE NOT PREVENTED

## 2023-05-22 NOTE — PROCEDURES
INSTRUMENTAL SWALLOW REPORT  MODIFIED BARIUM SWALLOW    NAME: Catalina Jean   : 1960  MRN: [id number]       Date of Eval: 2023              Referring Diagnosis(es):      Past Medical History:  has no past medical history on file. Past Surgical History:  has no past surgical history on file. Type of Study: Initial MBS      Patient Complaints/Reason for Referral:  Catalina Jean was referred for a MBS to assess the efficiency of his/her swallow function, assess for aspiration, and to make recommendations regarding safe dietary consistencies, effective compensatory strategies, and safe eating environment. Onset of problem:      69 Y/O male that presented to the Emergency Department following a fall from standing while going to the store to  some cigarettes and alcohol (Hx of extensive alcohol abuse). At OSH the patient was found to have a L trochanteric fracture as well as an intraparenchymal hemorrhage, subdural hematoma and subarachnoid hematoma. Patient was AOx3 for EMS in route and was AOx3 upon [place] [facility] [place]. Behavior/Cognition/Vision/Hearing:  Behavior/Cognition: Alert; Cooperative  Vision: Impaired    Impressions:  Patient presents with safe swallow for Dysphagia soft and bite/sized (Dysphagia III)  diet with Mildly Thick (Nectar)  liquids as evidenced by no overt s/s of aspiration noted with consistencies tested. + penetration with thin liquids, + swallow delay noted. Recommend small sips and bites, only feed when alert and awake and upright at 90 degrees for all PO intake. Recommend close monitoring for overt/clinical s/s of aspiration and D/C PO intake and complete Modified Barium Swallow Study should they occur. Results and recommendations reported to RN. Patient Position: Lateral       Consistencies Administered: Soft & Bite Sized;Pureed;Mildly Thick teaspoon; Thin straw      Dysphagia Outcome Severity Scale: Level 4: Mild

## 2023-05-23 LAB
ANION GAP SERPL CALCULATED.3IONS-SCNC: 11 MMOL/L (ref 9–17)
ANION GAP SERPL CALCULATED.3IONS-SCNC: 12 MMOL/L (ref 9–17)
ANION GAP SERPL CALCULATED.3IONS-SCNC: 12 MMOL/L (ref 9–17)
BASOPHILS # BLD: 0.04 K/UL (ref 0–0.2)
BASOPHILS NFR BLD: 1 % (ref 0–2)
BUN SERPL-MCNC: 5 MG/DL (ref 8–23)
CALCIUM SERPL-MCNC: 8.7 MG/DL (ref 8.6–10.4)
CHLORIDE SERPL-SCNC: 92 MMOL/L (ref 98–107)
CHLORIDE SERPL-SCNC: 97 MMOL/L (ref 98–107)
CHLORIDE SERPL-SCNC: 98 MMOL/L (ref 98–107)
CO2 SERPL-SCNC: 24 MMOL/L (ref 20–31)
CO2 SERPL-SCNC: 25 MMOL/L (ref 20–31)
CO2 SERPL-SCNC: 26 MMOL/L (ref 20–31)
CREAT SERPL-MCNC: 0.43 MG/DL (ref 0.7–1.2)
EKG ATRIAL RATE: 74 BPM
EKG P AXIS: 79 DEGREES
EKG P-R INTERVAL: 168 MS
EKG Q-T INTERVAL: 458 MS
EKG QRS DURATION: 88 MS
EKG QTC CALCULATION (BAZETT): 508 MS
EKG R AXIS: -79 DEGREES
EKG T AXIS: -71 DEGREES
EKG VENTRICULAR RATE: 74 BPM
EOSINOPHIL # BLD: <0.03 K/UL (ref 0–0.44)
EOSINOPHILS RELATIVE PERCENT: 0 % (ref 1–4)
ERYTHROCYTE [DISTWIDTH] IN BLOOD BY AUTOMATED COUNT: 12.1 % (ref 11.8–14.4)
GFR SERPL CREATININE-BSD FRML MDRD: >60 ML/MIN/1.73M2
GLUCOSE SERPL-MCNC: 98 MG/DL (ref 70–99)
HCT VFR BLD AUTO: 32.8 % (ref 40.7–50.3)
HGB BLD-MCNC: 11.4 G/DL (ref 13–17)
IMM GRANULOCYTES # BLD AUTO: <0.03 K/UL (ref 0–0.3)
IMM GRANULOCYTES NFR BLD: 0 %
LYMPHOCYTES # BLD: 26 % (ref 24–43)
LYMPHOCYTES NFR BLD: 1.18 K/UL (ref 1.1–3.7)
MAGNESIUM SERPL-MCNC: 2 MG/DL (ref 1.6–2.6)
MCH RBC QN AUTO: 34.5 PG (ref 25.2–33.5)
MCHC RBC AUTO-ENTMCNC: 34.8 G/DL (ref 28.4–34.8)
MCV RBC AUTO: 99.4 FL (ref 82.6–102.9)
MONOCYTES NFR BLD: 0.61 K/UL (ref 0.1–1.2)
MONOCYTES NFR BLD: 13 % (ref 3–12)
NEUTROPHILS NFR BLD: 60 % (ref 36–65)
NEUTS SEG NFR BLD: 2.71 K/UL (ref 1.5–8.1)
NRBC AUTOMATED: 0 PER 100 WBC
PHOSPHATE SERPL-MCNC: 4.3 MG/DL (ref 2.5–4.5)
PLATELET # BLD AUTO: 240 K/UL (ref 138–453)
PMV BLD AUTO: 8.9 FL (ref 8.1–13.5)
POTASSIUM SERPL-SCNC: 3.5 MMOL/L (ref 3.7–5.3)
POTASSIUM SERPL-SCNC: 3.9 MMOL/L (ref 3.7–5.3)
POTASSIUM SERPL-SCNC: 4.2 MMOL/L (ref 3.7–5.3)
RBC # BLD AUTO: 3.3 M/UL (ref 4.21–5.77)
SODIUM SERPL-SCNC: 129 MMOL/L (ref 135–144)
SODIUM SERPL-SCNC: 133 MMOL/L (ref 135–144)
SODIUM SERPL-SCNC: 135 MMOL/L (ref 135–144)
WBC OTHER # BLD: 4.6 K/UL (ref 3.5–11.3)

## 2023-05-23 PROCEDURE — 80048 BASIC METABOLIC PNL TOTAL CA: CPT

## 2023-05-23 PROCEDURE — 6370000000 HC RX 637 (ALT 250 FOR IP): Performed by: STUDENT IN AN ORGANIZED HEALTH CARE EDUCATION/TRAINING PROGRAM

## 2023-05-23 PROCEDURE — 2580000003 HC RX 258: Performed by: STUDENT IN AN ORGANIZED HEALTH CARE EDUCATION/TRAINING PROGRAM

## 2023-05-23 PROCEDURE — 97535 SELF CARE MNGMENT TRAINING: CPT

## 2023-05-23 PROCEDURE — 93010 ELECTROCARDIOGRAM REPORT: CPT | Performed by: INTERNAL MEDICINE

## 2023-05-23 PROCEDURE — 83735 ASSAY OF MAGNESIUM: CPT

## 2023-05-23 PROCEDURE — 84100 ASSAY OF PHOSPHORUS: CPT

## 2023-05-23 PROCEDURE — 6370000000 HC RX 637 (ALT 250 FOR IP)

## 2023-05-23 PROCEDURE — 97130 THER IVNTJ EA ADDL 15 MIN: CPT

## 2023-05-23 PROCEDURE — 6370000000 HC RX 637 (ALT 250 FOR IP): Performed by: NURSE PRACTITIONER

## 2023-05-23 PROCEDURE — 2060000000 HC ICU INTERMEDIATE R&B

## 2023-05-23 PROCEDURE — 80051 ELECTROLYTE PANEL: CPT

## 2023-05-23 PROCEDURE — 6360000002 HC RX W HCPCS: Performed by: STUDENT IN AN ORGANIZED HEALTH CARE EDUCATION/TRAINING PROGRAM

## 2023-05-23 PROCEDURE — 94761 N-INVAS EAR/PLS OXIMETRY MLT: CPT

## 2023-05-23 PROCEDURE — 99232 SBSQ HOSP IP/OBS MODERATE 35: CPT | Performed by: INTERNAL MEDICINE

## 2023-05-23 PROCEDURE — 85025 COMPLETE CBC W/AUTO DIFF WBC: CPT

## 2023-05-23 PROCEDURE — 94640 AIRWAY INHALATION TREATMENT: CPT

## 2023-05-23 PROCEDURE — 6370000000 HC RX 637 (ALT 250 FOR IP): Performed by: INTERNAL MEDICINE

## 2023-05-23 PROCEDURE — 99232 SBSQ HOSP IP/OBS MODERATE 35: CPT | Performed by: PHYSICAL MEDICINE & REHABILITATION

## 2023-05-23 PROCEDURE — 2580000003 HC RX 258: Performed by: INTERNAL MEDICINE

## 2023-05-23 PROCEDURE — 99233 SBSQ HOSP IP/OBS HIGH 50: CPT | Performed by: PSYCHIATRY & NEUROLOGY

## 2023-05-23 PROCEDURE — 36415 COLL VENOUS BLD VENIPUNCTURE: CPT

## 2023-05-23 PROCEDURE — 97129 THER IVNTJ 1ST 15 MIN: CPT

## 2023-05-23 RX ORDER — TAMSULOSIN HYDROCHLORIDE 0.4 MG/1
0.4 CAPSULE ORAL DAILY
Status: CANCELLED | OUTPATIENT
Start: 2023-05-24

## 2023-05-23 RX ORDER — GUAIFENESIN 600 MG/1
600 TABLET, EXTENDED RELEASE ORAL 2 TIMES DAILY
Status: CANCELLED | OUTPATIENT
Start: 2023-05-23

## 2023-05-23 RX ORDER — FOLIC ACID 1 MG/1
1 TABLET ORAL DAILY
Status: CANCELLED | OUTPATIENT
Start: 2023-05-24

## 2023-05-23 RX ORDER — LANOLIN ALCOHOL/MO/W.PET/CERES
100 CREAM (GRAM) TOPICAL DAILY
Status: CANCELLED | OUTPATIENT
Start: 2023-05-24

## 2023-05-23 RX ORDER — MULTIVITAMIN WITH IRON
1 TABLET ORAL DAILY
Status: CANCELLED | OUTPATIENT
Start: 2023-05-24

## 2023-05-23 RX ORDER — FAMOTIDINE 20 MG/1
20 TABLET, FILM COATED ORAL 2 TIMES DAILY
Status: CANCELLED | OUTPATIENT
Start: 2023-05-23

## 2023-05-23 RX ORDER — CHOLECALCIFEROL (VITAMIN D3) 125 MCG
5 CAPSULE ORAL NIGHTLY
Status: CANCELLED | OUTPATIENT
Start: 2023-05-23

## 2023-05-23 RX ORDER — IPRATROPIUM BROMIDE AND ALBUTEROL SULFATE 2.5; .5 MG/3ML; MG/3ML
1 SOLUTION RESPIRATORY (INHALATION) EVERY 4 HOURS PRN
Status: CANCELLED | OUTPATIENT
Start: 2023-05-23

## 2023-05-23 RX ORDER — POLYETHYLENE GLYCOL 3350 17 G/17G
17 POWDER, FOR SOLUTION ORAL DAILY
Status: CANCELLED | OUTPATIENT
Start: 2023-05-24

## 2023-05-23 RX ORDER — ERGOCALCIFEROL 1.25 MG/1
50000 CAPSULE ORAL WEEKLY
Status: CANCELLED | OUTPATIENT
Start: 2023-05-23

## 2023-05-23 RX ORDER — SODIUM CHLORIDE 1000 MG
1 TABLET, SOLUBLE MISCELLANEOUS 2 TIMES DAILY
Status: DISCONTINUED | OUTPATIENT
Start: 2023-05-23 | End: 2023-05-24 | Stop reason: HOSPADM

## 2023-05-23 RX ORDER — BUDESONIDE AND FORMOTEROL FUMARATE DIHYDRATE 80; 4.5 UG/1; UG/1
2 AEROSOL RESPIRATORY (INHALATION) 2 TIMES DAILY
Status: CANCELLED | OUTPATIENT
Start: 2023-05-23

## 2023-05-23 RX ORDER — ONDANSETRON 4 MG/1
4 TABLET, ORALLY DISINTEGRATING ORAL EVERY 8 HOURS PRN
Status: CANCELLED | OUTPATIENT
Start: 2023-05-23

## 2023-05-23 RX ORDER — DEXTROSE MONOHYDRATE 50 MG/ML
INJECTION, SOLUTION INTRAVENOUS CONTINUOUS
Status: DISCONTINUED | OUTPATIENT
Start: 2023-05-23 | End: 2023-05-23

## 2023-05-23 RX ORDER — SENNA PLUS 8.6 MG/1
1 TABLET ORAL NIGHTLY
Status: CANCELLED | OUTPATIENT
Start: 2023-05-23

## 2023-05-23 RX ORDER — ENOXAPARIN SODIUM 100 MG/ML
30 INJECTION SUBCUTANEOUS 2 TIMES DAILY
Status: CANCELLED | OUTPATIENT
Start: 2023-05-23

## 2023-05-23 RX ORDER — DEXTROSE MONOHYDRATE 50 MG/ML
INJECTION, SOLUTION INTRAVENOUS CONTINUOUS
Status: CANCELLED | OUTPATIENT
Start: 2023-05-23

## 2023-05-23 RX ORDER — ALBUTEROL SULFATE 90 UG/1
2 AEROSOL, METERED RESPIRATORY (INHALATION) EVERY 4 HOURS PRN
Status: CANCELLED | OUTPATIENT
Start: 2023-05-23

## 2023-05-23 RX ORDER — ONDANSETRON 2 MG/ML
4 INJECTION INTRAMUSCULAR; INTRAVENOUS EVERY 6 HOURS PRN
Status: CANCELLED | OUTPATIENT
Start: 2023-05-23

## 2023-05-23 RX ORDER — SODIUM CHLORIDE 0.9 % (FLUSH) 0.9 %
10 SYRINGE (ML) INJECTION PRN
Status: CANCELLED | OUTPATIENT
Start: 2023-05-23

## 2023-05-23 RX ORDER — ACETAMINOPHEN 500 MG
1000 TABLET ORAL EVERY 8 HOURS SCHEDULED
Status: CANCELLED | OUTPATIENT
Start: 2023-05-23

## 2023-05-23 RX ORDER — SODIUM CHLORIDE 0.9 % (FLUSH) 0.9 %
10 SYRINGE (ML) INJECTION EVERY 12 HOURS SCHEDULED
Status: CANCELLED | OUTPATIENT
Start: 2023-05-23

## 2023-05-23 RX ORDER — IPRATROPIUM BROMIDE AND ALBUTEROL SULFATE 2.5; .5 MG/3ML; MG/3ML
1 SOLUTION RESPIRATORY (INHALATION)
Status: CANCELLED | OUTPATIENT
Start: 2023-05-23

## 2023-05-23 RX ADMIN — POTASSIUM BICARBONATE 20 MEQ: 782 TABLET, EFFERVESCENT ORAL at 11:38

## 2023-05-23 RX ADMIN — IPRATROPIUM BROMIDE AND ALBUTEROL SULFATE 1 AMPULE: 2.5; .5 SOLUTION RESPIRATORY (INHALATION) at 15:07

## 2023-05-23 RX ADMIN — SODIUM CHLORIDE TAB 1 GM 1 G: 1 TAB at 14:51

## 2023-05-23 RX ADMIN — SODIUM CHLORIDE TAB 1 GM 1 G: 1 TAB at 20:36

## 2023-05-23 RX ADMIN — ENOXAPARIN SODIUM 30 MG: 30 INJECTION SUBCUTANEOUS at 20:36

## 2023-05-23 RX ADMIN — SODIUM CHLORIDE, PRESERVATIVE FREE 10 ML: 5 INJECTION INTRAVENOUS at 08:45

## 2023-05-23 RX ADMIN — FOLIC ACID 1 MG: 1 TABLET ORAL at 08:45

## 2023-05-23 RX ADMIN — ACETAMINOPHEN 1000 MG: 500 TABLET ORAL at 20:36

## 2023-05-23 RX ADMIN — Medication 7.5 MG: at 14:57

## 2023-05-23 RX ADMIN — GUAIFENESIN 600 MG: 600 TABLET, EXTENDED RELEASE ORAL at 08:44

## 2023-05-23 RX ADMIN — TAMSULOSIN HYDROCHLORIDE 0.4 MG: 0.4 CAPSULE ORAL at 08:44

## 2023-05-23 RX ADMIN — POTASSIUM BICARBONATE 20 MEQ: 782 TABLET, EFFERVESCENT ORAL at 08:46

## 2023-05-23 RX ADMIN — ACETAMINOPHEN 1000 MG: 500 TABLET ORAL at 14:23

## 2023-05-23 RX ADMIN — DEXTROSE MONOHYDRATE 500 ML: 50 INJECTION, SOLUTION INTRAVENOUS at 11:41

## 2023-05-23 RX ADMIN — METOPROLOL TARTRATE 25 MG: 25 TABLET ORAL at 08:44

## 2023-05-23 RX ADMIN — Medication 100 MG: at 08:45

## 2023-05-23 RX ADMIN — IPRATROPIUM BROMIDE AND ALBUTEROL SULFATE 1 AMPULE: 2.5; .5 SOLUTION RESPIRATORY (INHALATION) at 07:53

## 2023-05-23 RX ADMIN — METOPROLOL TARTRATE 25 MG: 25 TABLET ORAL at 20:36

## 2023-05-23 RX ADMIN — ALBUTEROL SULFATE 2 PUFF: 90 AEROSOL, METERED RESPIRATORY (INHALATION) at 04:10

## 2023-05-23 RX ADMIN — ACETAMINOPHEN 1000 MG: 500 TABLET ORAL at 05:27

## 2023-05-23 RX ADMIN — FAMOTIDINE 20 MG: 20 TABLET, FILM COATED ORAL at 08:45

## 2023-05-23 RX ADMIN — BUDESONIDE AND FORMOTEROL FUMARATE DIHYDRATE 2 PUFF: 80; 4.5 AEROSOL RESPIRATORY (INHALATION) at 07:52

## 2023-05-23 RX ADMIN — GUAIFENESIN 600 MG: 600 TABLET, EXTENDED RELEASE ORAL at 20:36

## 2023-05-23 RX ADMIN — ENOXAPARIN SODIUM 30 MG: 30 INJECTION SUBCUTANEOUS at 08:42

## 2023-05-23 RX ADMIN — SENNOSIDES 8.6 MG: 8.6 TABLET, COATED ORAL at 20:36

## 2023-05-23 RX ADMIN — FAMOTIDINE 20 MG: 20 TABLET, FILM COATED ORAL at 20:36

## 2023-05-23 RX ADMIN — SODIUM CHLORIDE, PRESERVATIVE FREE 10 ML: 5 INJECTION INTRAVENOUS at 20:37

## 2023-05-23 RX ADMIN — Medication 5 MG: at 20:36

## 2023-05-23 RX ADMIN — IPRATROPIUM BROMIDE AND ALBUTEROL SULFATE 1 AMPULE: 2.5; .5 SOLUTION RESPIRATORY (INHALATION) at 11:15

## 2023-05-23 RX ADMIN — ALCOHOL 1 TABLET: 70.47 GEL TOPICAL at 08:45

## 2023-05-23 RX ADMIN — DEXTROSE MONOHYDRATE: 50 INJECTION, SOLUTION INTRAVENOUS at 11:40

## 2023-05-23 ASSESSMENT — PAIN DESCRIPTION - DESCRIPTORS
DESCRIPTORS: ACHING
DESCRIPTORS: ACHING

## 2023-05-23 ASSESSMENT — PAIN SCALES - GENERAL
PAINLEVEL_OUTOF10: 1
PAINLEVEL_OUTOF10: 0
PAINLEVEL_OUTOF10: 5
PAINLEVEL_OUTOF10: 0

## 2023-05-23 ASSESSMENT — PAIN DESCRIPTION - LOCATION
LOCATION: HEAD
LOCATION: HEAD

## 2023-05-23 ASSESSMENT — PAIN - FUNCTIONAL ASSESSMENT: PAIN_FUNCTIONAL_ASSESSMENT: ACTIVITIES ARE NOT PREVENTED

## 2023-05-23 NOTE — DISCHARGE SUMMARY
DISCHARGE SUMMARY:    PATIENT NAME:  Marci Betancourt  YOB: 1960  MEDICAL RECORD NO. [id number]  DATE: 05/23/23  PRIMARY CARE PHYSICIAN: No primary care provider on file. ADMIT DATE: 5/14/2023  DISPOSITION:  Rehab Facility at [address] [place] [address] Avenue:   5/24/2023  ADMITTING DIAGNOSIS:   [unfilled]    DIAGNOSIS:   Patient Active Problem List   Diagnosis    Subdural hematoma (HCC)    Intracranial hemorrhage (HCC)    Atrial fibrillation (HCC)    Encephalopathy acute    Hyponatremia    Alcohol abuse       CONSULTANTS:  Neurosurgery, orthopedic surgery, social work, cardiology, physical medicine and rehabilitation, internal medicine, endovascular surgery, nephrology    PROCEDURES:   Modified barium swallow study,:     HOSPITAL COURSE:   Marci Betancourt is a 70 y.o. male who was admitted on 5/14/2023 with comminuted fracture of the superior portion of the right greater trochanter and a 3.3 x 1.0 cm intraparenchymal hematoma as well as a acute left frontal temporoparietal subdural hematoma and mild subarachnoid hemorrhage in the left frontal lobe. Patient admitted to ICU for frequent monitoring and neurochecks. Repeat head CT showed stabilization of the head bleeds. Patient was transferred to the floor for continued monitoring and medical management. Patient was hyponatremic on arrival with subsequent increase during the initial part of his course. He then began to slowly drift down into the 120s. At that time nephrology was consulted for management. Sodium levels began to improve and patient was deemed safe and clinically stable to be transferred to [facility]. Labs and imaging were followed daily. At time of discharge, Marci Betancourt was tolerating a regular diet, having bowel movements, ambulating on his own accord with assistance, had adequate analgesia on oral pain medications, and had no signs of symptoms of complications.   He was deemed medically stable

## 2023-05-24 VITALS
WEIGHT: 135.58 LBS | BODY MASS INDEX: 21.28 KG/M2 | OXYGEN SATURATION: 92 % | SYSTOLIC BLOOD PRESSURE: 133 MMHG | HEIGHT: 67 IN | RESPIRATION RATE: 20 BRPM | HEART RATE: 110 BPM | TEMPERATURE: 98 F | DIASTOLIC BLOOD PRESSURE: 96 MMHG

## 2023-05-24 PROBLEM — S06.5XAD: Status: ACTIVE | Noted: 2023-05-24

## 2023-05-24 LAB
ANION GAP SERPL CALCULATED.3IONS-SCNC: 10 MMOL/L (ref 9–17)
BASOPHILS # BLD: 0.06 K/UL (ref 0–0.2)
BASOPHILS NFR BLD: 1 % (ref 0–2)
BUN SERPL-MCNC: 7 MG/DL (ref 8–23)
CALCIUM SERPL-MCNC: 9.1 MG/DL (ref 8.6–10.4)
CHLORIDE SERPL-SCNC: 99 MMOL/L (ref 98–107)
CO2 SERPL-SCNC: 26 MMOL/L (ref 20–31)
CREAT SERPL-MCNC: 0.42 MG/DL (ref 0.7–1.2)
EOSINOPHIL # BLD: 0.03 K/UL (ref 0–0.44)
EOSINOPHILS RELATIVE PERCENT: 1 % (ref 1–4)
ERYTHROCYTE [DISTWIDTH] IN BLOOD BY AUTOMATED COUNT: 12.2 % (ref 11.8–14.4)
GFR SERPL CREATININE-BSD FRML MDRD: >60 ML/MIN/1.73M2
GLUCOSE SERPL-MCNC: 148 MG/DL (ref 70–99)
HCT VFR BLD AUTO: 32.1 % (ref 40.7–50.3)
HGB BLD-MCNC: 10.8 G/DL (ref 13–17)
IMM GRANULOCYTES # BLD AUTO: <0.03 K/UL (ref 0–0.3)
IMM GRANULOCYTES NFR BLD: 0 %
LYMPHOCYTES # BLD: 20 % (ref 24–43)
LYMPHOCYTES NFR BLD: 1.06 K/UL (ref 1.1–3.7)
MCH RBC QN AUTO: 34.4 PG (ref 25.2–33.5)
MCHC RBC AUTO-ENTMCNC: 33.6 G/DL (ref 28.4–34.8)
MCV RBC AUTO: 102.2 FL (ref 82.6–102.9)
MONOCYTES NFR BLD: 0.5 K/UL (ref 0.1–1.2)
MONOCYTES NFR BLD: 9 % (ref 3–12)
NEUTROPHILS NFR BLD: 69 % (ref 36–65)
NEUTS SEG NFR BLD: 3.69 K/UL (ref 1.5–8.1)
NRBC AUTOMATED: 0 PER 100 WBC
PLATELET # BLD AUTO: 284 K/UL (ref 138–453)
PMV BLD AUTO: 9 FL (ref 8.1–13.5)
POTASSIUM SERPL-SCNC: 3.9 MMOL/L (ref 3.7–5.3)
RBC # BLD AUTO: 3.14 M/UL (ref 4.21–5.77)
SODIUM SERPL-SCNC: 135 MMOL/L (ref 135–144)
WBC OTHER # BLD: 5.4 K/UL (ref 3.5–11.3)

## 2023-05-24 PROCEDURE — 2580000003 HC RX 258: Performed by: STUDENT IN AN ORGANIZED HEALTH CARE EDUCATION/TRAINING PROGRAM

## 2023-05-24 PROCEDURE — 85025 COMPLETE CBC W/AUTO DIFF WBC: CPT

## 2023-05-24 PROCEDURE — 94761 N-INVAS EAR/PLS OXIMETRY MLT: CPT

## 2023-05-24 PROCEDURE — 80048 BASIC METABOLIC PNL TOTAL CA: CPT

## 2023-05-24 PROCEDURE — 6370000000 HC RX 637 (ALT 250 FOR IP)

## 2023-05-24 PROCEDURE — 6370000000 HC RX 637 (ALT 250 FOR IP): Performed by: INTERNAL MEDICINE

## 2023-05-24 PROCEDURE — 99232 SBSQ HOSP IP/OBS MODERATE 35: CPT | Performed by: INTERNAL MEDICINE

## 2023-05-24 PROCEDURE — 6370000000 HC RX 637 (ALT 250 FOR IP): Performed by: NURSE PRACTITIONER

## 2023-05-24 PROCEDURE — 6360000002 HC RX W HCPCS: Performed by: STUDENT IN AN ORGANIZED HEALTH CARE EDUCATION/TRAINING PROGRAM

## 2023-05-24 PROCEDURE — 6370000000 HC RX 637 (ALT 250 FOR IP): Performed by: STUDENT IN AN ORGANIZED HEALTH CARE EDUCATION/TRAINING PROGRAM

## 2023-05-24 PROCEDURE — 36415 COLL VENOUS BLD VENIPUNCTURE: CPT

## 2023-05-24 PROCEDURE — 94640 AIRWAY INHALATION TREATMENT: CPT

## 2023-05-24 RX ORDER — SODIUM CHLORIDE 1000 MG
1 TABLET, SOLUBLE MISCELLANEOUS 2 TIMES DAILY
Qty: 90 TABLET | Refills: 3 | Status: ON HOLD | OUTPATIENT
Start: 2023-05-24

## 2023-05-24 RX ORDER — ALBUTEROL SULFATE 90 UG/1
2 AEROSOL, METERED RESPIRATORY (INHALATION) EVERY 4 HOURS PRN
Qty: 18 G | Refills: 3 | Status: ON HOLD | OUTPATIENT
Start: 2023-05-24

## 2023-05-24 RX ORDER — ACETAMINOPHEN 325 MG/1
650 TABLET ORAL EVERY 4 HOURS PRN
Status: CANCELLED | OUTPATIENT
Start: 2023-05-24

## 2023-05-24 RX ORDER — BISACODYL 10 MG
10 SUPPOSITORY, RECTAL RECTAL DAILY PRN
Status: CANCELLED | OUTPATIENT
Start: 2023-05-24

## 2023-05-24 RX ORDER — BUDESONIDE AND FORMOTEROL FUMARATE DIHYDRATE 80; 4.5 UG/1; UG/1
2 AEROSOL RESPIRATORY (INHALATION) 2 TIMES DAILY
Qty: 10.2 G | Refills: 3 | Status: ON HOLD | OUTPATIENT
Start: 2023-05-24

## 2023-05-24 RX ORDER — POLYETHYLENE GLYCOL 3350 17 G/17G
17 POWDER, FOR SOLUTION ORAL DAILY
Status: CANCELLED | OUTPATIENT
Start: 2023-05-24

## 2023-05-24 RX ORDER — TAMSULOSIN HYDROCHLORIDE 0.4 MG/1
0.4 CAPSULE ORAL DAILY
Qty: 30 CAPSULE | Refills: 3 | Status: ON HOLD | OUTPATIENT
Start: 2023-05-25

## 2023-05-24 RX ORDER — FAMOTIDINE 20 MG/1
20 TABLET, FILM COATED ORAL 2 TIMES DAILY
Qty: 60 TABLET | Refills: 3 | Status: ON HOLD | OUTPATIENT
Start: 2023-05-24

## 2023-05-24 RX ORDER — SENNA PLUS 8.6 MG/1
2 TABLET ORAL DAILY PRN
Status: CANCELLED | OUTPATIENT
Start: 2023-05-24

## 2023-05-24 RX ORDER — IPRATROPIUM BROMIDE AND ALBUTEROL SULFATE 2.5; .5 MG/3ML; MG/3ML
3 SOLUTION RESPIRATORY (INHALATION) EVERY 4 HOURS PRN
Qty: 252 ML | Refills: 0 | Status: ON HOLD | OUTPATIENT
Start: 2023-05-24 | End: 2023-06-07

## 2023-05-24 RX ORDER — LANOLIN ALCOHOL/MO/W.PET/CERES
100 CREAM (GRAM) TOPICAL DAILY
Qty: 30 TABLET | Refills: 3 | Status: ON HOLD | OUTPATIENT
Start: 2023-05-25

## 2023-05-24 RX ADMIN — FOLIC ACID 1 MG: 1 TABLET ORAL at 08:39

## 2023-05-24 RX ADMIN — SODIUM CHLORIDE TAB 1 GM 1 G: 1 TAB at 08:40

## 2023-05-24 RX ADMIN — POLYETHYLENE GLYCOL 3350 17 G: 17 POWDER, FOR SOLUTION ORAL at 08:37

## 2023-05-24 RX ADMIN — ALCOHOL 1 TABLET: 70.47 GEL TOPICAL at 08:39

## 2023-05-24 RX ADMIN — ACETAMINOPHEN 1000 MG: 500 TABLET ORAL at 14:24

## 2023-05-24 RX ADMIN — GUAIFENESIN 600 MG: 600 TABLET, EXTENDED RELEASE ORAL at 08:40

## 2023-05-24 RX ADMIN — Medication 100 MG: at 08:39

## 2023-05-24 RX ADMIN — SODIUM CHLORIDE, PRESERVATIVE FREE 10 ML: 5 INJECTION INTRAVENOUS at 08:38

## 2023-05-24 RX ADMIN — FAMOTIDINE 20 MG: 20 TABLET, FILM COATED ORAL at 08:39

## 2023-05-24 RX ADMIN — BUDESONIDE AND FORMOTEROL FUMARATE DIHYDRATE 2 PUFF: 80; 4.5 AEROSOL RESPIRATORY (INHALATION) at 07:59

## 2023-05-24 RX ADMIN — ENOXAPARIN SODIUM 30 MG: 30 INJECTION SUBCUTANEOUS at 08:39

## 2023-05-24 RX ADMIN — METOPROLOL TARTRATE 25 MG: 25 TABLET ORAL at 08:39

## 2023-05-24 RX ADMIN — TAMSULOSIN HYDROCHLORIDE 0.4 MG: 0.4 CAPSULE ORAL at 08:39

## 2023-05-24 ASSESSMENT — PAIN SCALES - GENERAL
PAINLEVEL_OUTOF10: 0
PAINLEVEL_OUTOF10: 0
PAINLEVEL_OUTOF10: 3
PAINLEVEL_OUTOF10: 0

## 2023-05-24 ASSESSMENT — PAIN DESCRIPTION - LOCATION: LOCATION: HEAD

## 2023-05-24 ASSESSMENT — PAIN DESCRIPTION - DESCRIPTORS: DESCRIPTORS: ACHING

## 2023-05-24 ASSESSMENT — PAIN - FUNCTIONAL ASSESSMENT: PAIN_FUNCTIONAL_ASSESSMENT: PREVENTS OR INTERFERES SOME ACTIVE ACTIVITIES AND ADLS

## 2023-05-24 NOTE — PLAN OF CARE
BRONCHOSPASM/BRONCHOCONSTRICTION     [x]         IMPROVE AERATION/BREATH SOUNDS  [x]   ADMINISTER BRONCHODILATOR THERAPY AS APPROPRIATE  [x]   ASSESS BREATH SOUNDS  []   IMPLEMENT AEROSOL/MDI PROTOCOL    Problem: Respiratory - Adult  Goal: Achieves optimal ventilation and oxygenation  5/22/2023 0946 by Terra Dyer RCP  Outcome: Progressing      PATIENT EDUCATION AS NEEDED
NEUROSURGERY TO SIGN OFF     Please contact Neurosurgery with any questions or acute changes in neurological exam    PATIENT TO FOLLOW UP IN CLINIC:  Follow-up with Neurosurgery  [address]/[facility] (Medical Office Building 2)  [address]  Call [phone] for an appointment.        Electronically signed by BENJY Gonzales NP on 5/22/2023 at 9:50 AM
Problem: Discharge Planning  Goal: Discharge to home or other facility with appropriate resources  5/22/2023 1747 by Guanako Young RN  Outcome: Progressing  Flowsheets (Taken 5/22/2023 0800)  Discharge to home or other facility with appropriate resources: Identify barriers to discharge with patient and caregiver  5/22/2023 0517 by Huan Mendieta RN  Outcome: Progressing     Problem: Safety - Adult  Goal: Free from fall injury  5/22/2023 1747 by Guanako Young RN  Outcome: Progressing  Flowsheets (Taken 5/22/2023 0800)  Free From Fall Injury: Instruct family/caregiver on patient safety  5/22/2023 0517 by Huan Mendieta RN  Outcome: Progressing     Problem: Pain  Goal: Verbalizes/displays adequate comfort level or baseline comfort level  5/22/2023 1747 by Guanako Young RN  Outcome: Progressing  Flowsheets  Taken 5/22/2023 1612  Verbalizes/displays adequate comfort level or baseline comfort level:   Encourage patient to monitor pain and request assistance   Assess pain using appropriate pain scale  Taken 5/22/2023 1206  Verbalizes/displays adequate comfort level or baseline comfort level:   Encourage patient to monitor pain and request assistance   Assess pain using appropriate pain scale  Taken 5/22/2023 0719  Verbalizes/displays adequate comfort level or baseline comfort level: Encourage patient to monitor pain and request assistance  5/22/2023 0517 by Huan Mendieta RN  Outcome: Progressing     Problem: Skin/Tissue Integrity  Goal: Absence of new skin breakdown  Description: 1. Monitor for areas of redness and/or skin breakdown  2. Assess vascular access sites hourly  3. Every 4-6 hours minimum:  Change oxygen saturation probe site  4. Every 4-6 hours:  If on nasal continuous positive airway pressure, respiratory therapy assess nares and determine need for appliance change or resting period.   5/22/2023 1747 by Guanako Young RN  Outcome: Progressing  5/22/2023 0517 by Huan Mendieat RN  Outcome:
Problem: Discharge Planning  Goal: Discharge to home or other facility with appropriate resources  5/23/2023 0501 by Tanya Bean RN  Outcome: Progressing  5/22/2023 1747 by Chichi Fuentes RN  Outcome: Progressing  Flowsheets (Taken 5/22/2023 0800)  Discharge to home or other facility with appropriate resources: Identify barriers to discharge with patient and caregiver     Problem: Safety - Adult  Goal: Free from fall injury  5/23/2023 0501 by Tanya Bean RN  Outcome: Progressing  5/22/2023 1747 by Chichi Fuentes RN  Outcome: Progressing  Flowsheets (Taken 5/22/2023 0800)  Free From Fall Injury: Instruct family/caregiver on patient safety     Problem: Pain  Goal: Verbalizes/displays adequate comfort level or baseline comfort level  5/23/2023 0501 by Tanya Bean RN  Outcome: Progressing  5/22/2023 1747 by Chichi Fuentes RN  Outcome: Progressing  Flowsheets  Taken 5/22/2023 1612  Verbalizes/displays adequate comfort level or baseline comfort level:   Encourage patient to monitor pain and request assistance   Assess pain using appropriate pain scale  Taken 5/22/2023 1206  Verbalizes/displays adequate comfort level or baseline comfort level:   Encourage patient to monitor pain and request assistance   Assess pain using appropriate pain scale  Taken 5/22/2023 0719  Verbalizes/displays adequate comfort level or baseline comfort level: Encourage patient to monitor pain and request assistance     Problem: Skin/Tissue Integrity  Goal: Absence of new skin breakdown  Description: 1. Monitor for areas of redness and/or skin breakdown  2. Assess vascular access sites hourly  3. Every 4-6 hours minimum:  Change oxygen saturation probe site  4. Every 4-6 hours:  If on nasal continuous positive airway pressure, respiratory therapy assess nares and determine need for appliance change or resting period.   5/23/2023 0501 by Tanya Bean RN  Outcome: Progressing  5/22/2023 1747 by Chichi Fuentes RN  Outcome:
Problem: Discharge Planning  Goal: Discharge to home or other facility with appropriate resources  5/24/2023 0502 by Maggie Schaffer RN  Outcome: Progressing  5/23/2023 1758 by Greg Davis RN  Outcome: Progressing  Flowsheets (Taken 5/23/2023 0800)  Discharge to home or other facility with appropriate resources: Identify barriers to discharge with patient and caregiver     Problem: Safety - Adult  Goal: Free from fall injury  5/24/2023 0502 by Maggie Schaffer RN  Outcome: Progressing  5/23/2023 1758 by Greg Davis RN  Outcome: Progressing  Flowsheets (Taken 5/23/2023 0800)  Free From Fall Injury: Instruct family/caregiver on patient safety     Problem: Pain  Goal: Verbalizes/displays adequate comfort level or baseline comfort level  5/24/2023 0502 by Maggie Schaffer RN  Outcome: Progressing  5/23/2023 1758 by Greg Davis RN  Outcome: Progressing  Flowsheets  Taken 5/23/2023 1642  Verbalizes/displays adequate comfort level or baseline comfort level: Encourage patient to monitor pain and request assistance  Taken 5/23/2023 1153  Verbalizes/displays adequate comfort level or baseline comfort level:   Encourage patient to monitor pain and request assistance   Assess pain using appropriate pain scale  Taken 5/23/2023 0800  Verbalizes/displays adequate comfort level or baseline comfort level: Encourage patient to monitor pain and request assistance     Problem: Skin/Tissue Integrity  Goal: Absence of new skin breakdown  Description: 1. Monitor for areas of redness and/or skin breakdown  2. Assess vascular access sites hourly  3. Every 4-6 hours minimum:  Change oxygen saturation probe site  4. Every 4-6 hours:  If on nasal continuous positive airway pressure, respiratory therapy assess nares and determine need for appliance change or resting period.   5/24/2023 0502 by Maggie Schaffer RN  Outcome: Progressing  5/23/2023 1758 by Greg Davis RN  Outcome: Progressing     Problem: ABCDS Injury Assessment  Goal:
Problem: Discharge Planning  Goal: Discharge to home or other facility with appropriate resources  5/24/2023 1651 by Bria Rouse RN  Outcome: Progressing  Flowsheets (Taken 5/24/2023 0800)  Discharge to home or other facility with appropriate resources: Identify barriers to discharge with patient and caregiver  5/24/2023 0502 by Rubén Neri RN  Outcome: Progressing     Problem: Safety - Adult  Goal: Free from fall injury  5/24/2023 1651 by Bria Rouse RN  Outcome: Progressing  Flowsheets (Taken 5/24/2023 0800)  Free From Fall Injury: Instruct family/caregiver on patient safety  5/24/2023 0502 by Rubén Neri RN  Outcome: Progressing     Problem: Pain  Goal: Verbalizes/displays adequate comfort level or baseline comfort level  5/24/2023 1651 by Bria Rouse RN  Outcome: Progressing  Flowsheets  Taken 5/24/2023 1558  Verbalizes/displays adequate comfort level or baseline comfort level: Encourage patient to monitor pain and request assistance  Taken 5/24/2023 1147  Verbalizes/displays adequate comfort level or baseline comfort level: Encourage patient to monitor pain and request assistance  5/24/2023 0502 by Rubén Neri RN  Outcome: Progressing     Problem: Skin/Tissue Integrity  Goal: Absence of new skin breakdown  Description: 1. Monitor for areas of redness and/or skin breakdown  2. Assess vascular access sites hourly  3. Every 4-6 hours minimum:  Change oxygen saturation probe site  4. Every 4-6 hours:  If on nasal continuous positive airway pressure, respiratory therapy assess nares and determine need for appliance change or resting period.   5/24/2023 1651 by Bria Rouse RN  Outcome: Progressing  5/24/2023 0502 by Rubén Neri RN  Outcome: Progressing     Problem: ABCDS Injury Assessment  Goal: Absence of physical injury  5/24/2023 1651 by Bria Rouse RN  Outcome: Progressing  Flowsheets (Taken 5/24/2023 0800)  Absence of Physical Injury: Implement safety measures based on patient
Problem: Discharge Planning  Goal: Discharge to home or other facility with appropriate resources  Outcome: Progressing     Problem: Safety - Adult  Goal: Free from fall injury  Outcome: Progressing     Problem: Pain  Goal: Verbalizes/displays adequate comfort level or baseline comfort level  Outcome: Progressing     Problem: Skin/Tissue Integrity  Goal: Absence of new skin breakdown  Description: 1. Monitor for areas of redness and/or skin breakdown  2. Assess vascular access sites hourly  3. Every 4-6 hours minimum:  Change oxygen saturation probe site  4. Every 4-6 hours:  If on nasal continuous positive airway pressure, respiratory therapy assess nares and determine need for appliance change or resting period.   Outcome: Progressing     Problem: ABCDS Injury Assessment  Goal: Absence of physical injury  Outcome: Progressing     Problem: Respiratory - Adult  Goal: Achieves optimal ventilation and oxygenation  Outcome: Progressing
Problem: Respiratory - Adult  Goal: Achieves optimal ventilation and oxygenation  5/23/2023 0759 by Giuseppe Resendiz RCP  Outcome: Progressing   BRONCHOSPASM/BRONCHOCONSTRICTION     [x]         IMPROVE AERATION/BREATH SOUNDS  [x]   ADMINISTER BRONCHODILATOR THERAPY AS APPROPRIATE  [x]   ASSESS BREATH SOUNDS  [x]   IMPLEMENT AEROSOL/MDI PROTOCOL  [x]   PATIENT EDUCATION AS NEEDED
Problem: Respiratory - Adult  Goal: Achieves optimal ventilation and oxygenation  5/24/2023 1112 by Zaira Hodges RCP  Outcome: Progressing   BRONCHOSPASM/BRONCHOCONSTRICTION     [x]         IMPROVE AERATION/BREATH SOUNDS  [x]   ADMINISTER BRONCHODILATOR THERAPY AS APPROPRIATE  [x]   ASSESS BREATH SOUNDS  []   IMPLEMENT AEROSOL/MDI PROTOCOL  [x]   PATIENT EDUCATION AS NEEDED   PROVIDE ADEQUATE OXYGENATION WITH ACCEPTABLE SP02/ABG'S    [x]  IDENTIFY APPROPRIATE OXYGEN THERAPY  [x]   MONITOR SP02/ABG'S AS NEEDED   [x]   PATIENT EDUCATION AS NEEDED
Absence of physical injury  5/23/2023 1758 by Julia Heller RN  Outcome: Progressing  Flowsheets (Taken 5/23/2023 0800)  Absence of Physical Injury: Implement safety measures based on patient assessment  5/23/2023 0501 by Camila Lopez RN  Outcome: Progressing     Problem: Respiratory - Adult  Goal: Achieves optimal ventilation and oxygenation  5/23/2023 1758 by Julia Heller RN  Outcome: Progressing  Flowsheets (Taken 5/23/2023 0800)  Achieves optimal ventilation and oxygenation: Assess for changes in respiratory status  5/23/2023 0759 by Doyle Jarquin RCP  Outcome: Progressing  5/23/2023 0501 by Camila Lopez RN  Outcome: Progressing     Problem: Nutrition Deficit:  Goal: Optimize nutritional status  5/23/2023 1758 by Julia Heller RN  Outcome: Progressing  5/23/2023 0501 by Camila Lopez RN  Outcome: Progressing

## 2023-05-24 NOTE — CARE COORDINATION
CM updated by RN that patient is unable to discharge to [facility] today d/t Na+ levels. Precert good until 5/51.    [facility]- NEERAJ spoke with Parag from [facility] and they will not have a bed available until tomorrow. CM updated patient.
NEERAJ spoke with Parag from [address] [place] [address] and she states that they will have a bed available and can admit patient today. 3pm transportation request faxed to  [address]. [facility] and [facility]pm [facility] time. Patient updated and he verbalizes being agreeable to discharging to [address] [place] [address] this afternoon. RN to call report to [facility]    Discharge [facility] Case Management Department  Written by: Cathie Molina RN    Patient Name: Della Capellan  Kent  Attending Provider: Leon Villarreal MD  Admit Date: 2023  3:30 PM  MRN: [id number]  Account: [de-identified]                     : 1960  Discharge Date: 23      Disposition: [facility] Flow/Interdisciplinary Rounds Progress Note    Quality Flow Rounds held on May 22, 2023 at [address] [facility] Attending:  Bedside Nurse, , and Nursing Unit Leadership    Barriers to Discharge: Clinical Status    Anticipated Discharge Date:   5/23/23    Anticipated Discharge Disposition:[facility]    Readmission Risk              Risk of Unplanned Readmission:  16           Discussed patient goal for the day, patient clinical progression, and barriers to discharge. Accepted to [facility] when medically ready for discharge.        Salem Habermann, RN  May 22, 2023
Limits    Current functional status for expression: Within Functional Limits    Current functional status for social interaction: Exceptions To Functional Limits    Current functional status for problem solving: Exceptions To Functional Limits    Current functional status for memory: Exceptions To Functional Limits    Current Deficits R/T Impairment: Impaired Functional Mobility and Decreased ADLs    Required Therapy Services:  Physical Therapy: Yes  Occupational Therapy: Yes  Speech Therapy: Yes      Additional Services:    [x] /Case Management    [] Recreational Therapy, as appropriate    [x] Nutrition Consult, as appropriate  [x] Dietary Needs/Preferences: Specialized Dietary Needs/Preferences indicated as follows: ADULT DIET; Dysphagia - Soft and Bite Sized; Mildly Thick (Nectar)  [] Dialysis  [x] Cultural/Buddhism Needs/Preferences: No Specialized Cultural/Buddhism Needs/Preferences Identified  [] Special Equipment Needs  [] Special Medication Needs  [] Other information relevant to patient's care needs:    Preferred Language: English    Does the patient need or want an  to communicate with a doctor or health care staff? No    Rehab Justification:  Needs 3 hrs therapy per day or 15 hours per week:  Yes  Identified Rehab Nursing needs: Yes  Intense Interdisciplinary need:  Yes  Need for 24 hr physician supervision:  Yes  Measurable improved quality of life:  Yes  Willingness to participate:  Yes  Medical Necessity:  Yes  Patient able to tolerate care proposed:  Yes    Expected Discharge Destination/Functional Level:  Home with assist    Expected length of time to achieve level of improvement: Approximately 2 Weeks  Please Note: Estimated length of stay is based on individual condition and Acute Inpatient Rehabilitation specific needs. Length of stay may vary based upon interdisciplinary team assessment, insurance approval, and patient progression.     Expected Post Discharge

## 2023-05-24 NOTE — PROGRESS NOTES
[facility] Inpatient Progress Note  General SayerBecky   5/22/2023  10:19 AM  Shane Robles Number  [id number]  [id number]      Time spent with Patient: 0 minutes    This writer was unable to complete screen or therapy services with patient at bedside at this time due to the following:   Other service provider in room / procedure being performed
[facility] Inpatient Psychotherapy Note  Fay RichardsMarkos   5/23/2023  1:21 PM  Kenn Kathleen. Ángel Romanchetna  1960  [id number]      Time spent with Patient: 7 Minutes       Presenting Patient Report:   Therapist stopped by pt room for check in. Pt denied any current difficult emotions. Pt did briefly talk about the deaths of his parents but reports he \"doesn't think about those things. \"  Pt denied need to talk about anything. Therapist provided pt with business card and encouraged him to contact therapist if he had any bedside needs. MSE:     Appearance    alert, cooperative  Appetite normal  Sleep disturbance No  Fatigue No  Loss of pleasure No  Impulsive behavior No  Speech    normal rate, normal volume, and well articulated  Mood    normal  Affect    normal affect  Thought Content    intact  Thought Process    linear, goal directed, and coherent  Associations    logical connections  Insight    Good  Judgment    Intact  Orientation    oriented to person, place, time, and general circumstances  Memory    recent and remote memory intact  Attention/Concentration    intact  Morbid ideation No  Suicide Assessment    no suicidal ideation    (See C-SSRS in flow sheets if suicidal ideations are present)  (PCL-5, PHQ-9, SHAYLA-7 available in flow sheets)    Assessment:  Pt presented in normal mood with normal affect. Pt initially thought therapist was a family member but once understanding pf therapist's role in care he expressed disinterest.  Pt did not want to speak about his emotions. Pt did briefly process some grief over the death of his parents but did not want to discuss anything further. No plan to follow up. Screening Scale Results:    Therapist unable to conduct PCL5 per pt's denial  Injured Trauma Survivor Screen ([facility])   Screened On 5/15/23  Patient's score= 3 for PTSD risk. ? 2 is positive for PTSD risk. Patient's score= 0 for depression risk.   ? 2 is positive for Depression risk
Comprehensive Nutrition Assessment    Type and Reason for Visit:  Initial, RD Nutrition Re-Screen/LOS (7 day length of stay)    Nutrition Recommendations/Plan:   Resume diet and oral nutrition supplement as able. Continue to monitor weights, intakes, labs, swallow study and follow up. Malnutrition Assessment:  Malnutrition Status:  Mild malnutrition (05/22/23 1304)    Context:  Acute Illness     Findings of the 6 clinical characteristics of malnutrition:  Energy Intake:  Unable to assess  Weight Loss:  Unable to assess     Body Fat Loss:  Mild body fat loss Orbital, Buccal region   Muscle Mass Loss:  Mild muscle mass loss Temples (temporalis)  Fluid Accumulation:  No significant fluid accumulation     Strength:  Not Performed    Nutrition Assessment:    69 y/o male, admitted related to subdural hematoma, AFib, intracranial hemorrhage, encephalopathy, hyponatremia, ETOH abuse. Patient made NPO today for swallow study. 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 Patient reported he was eating well prior to NPO, consuming the standard high calorie/high protien oral nutrition supplement. Patient reports no weight loss prior to admission. Nutrition Related Findings:    Labs: sodium 124, BUN 5, Cr 0.28, calcium 8.1, potassium 3.6, 85; Meds: potassium chloride, seroquel, sodium chloride tabs, folic acid, thiamine Wound Type: None       Current Nutrition Intake & Therapies:    Average Meal Intake: NPO  Average Supplements Intake: NPO  Diet NPO Exceptions are: Sips of Water with Meds    Anthropometric Measures:  Height: 5' 7\" (170.2 cm)  Ideal Body Weight (IBW): 148 lbs (67 kg)    Current Body Weight: 135 lb 9.3 oz (61.5 kg), 91.6 % IBW.  Weight Source: Bed Scale  Current BMI (kg/m2): 21.2  Weight Adjustment For: No Adjustment  BMI Categories: Underweight (BMI less than 22) age over 72    Estimated Daily Nutrient Needs:  Energy Requirements Based On: Kcal/kg  Weight Used for Energy Requirements:
Endovascular Neurosurgery Progress Note    SUBJECTIVE:   No acute events overnight     Review of Systems:  CONSTITUTIONAL:  negative for fevers, chills, fatigue and malaise    EYES:  negative for double vision, blurred vision and photophobia     HEENT:  negative for tinnitus, epistaxis and sore throat    RESPIRATORY:  negative for cough, shortness of breath, wheezing    CARDIOVASCULAR:  negative for chest pain, palpitations, syncope, edema    GASTROINTESTINAL:  negative for nausea, vomiting    GENITOURINARY:  negative for incontinence    MUSCULOSKELETAL:  negative for neck or back pain    NEUROLOGICAL:  Negative for weakness and tingling  negative for headaches and dizziness    PSYCHIATRIC:  negative for anxiety      Review of systems otherwise negative. OBJECTIVE:     Vitals:    05/22/23 0921   BP: 122/65   Pulse:    Resp:    Temp:    SpO2: 96%        General:  Gen: normal habitus, NAD  HEENT: NCAT, mucosa moist  Cvs: RRR, S1 S2 normal  Resp: symmetric unlabored breathing  Abd: s/nd/nt  Ext: no edema  Skin: no lesions seen, warm and dry    Neuro:  Gen: alert, awake and was oriented to place and person today, not oriented to month, but oriented to year  Lang/speech: dysarthria, follows commands   CN: PERRL, EOMI, VFF, V1-3 intact, face symmetric, hearing intact, shoulder shrug symmetric, tongue midline  Motor: grossly 5/5 UE and LE b/l  Sense: LT intact in all 4 ext. Coord: FTN and HTS intact b/l  DTR: deferred  Gait: narrow base gait    NIH Stroke Scale:   1a  Level of consciousness: 0 - alert; keenly responsive   1b. LOC questions:  0 - answers both questions correctly   1c. LOC commands: 0 - performs both tasks correctly   2. Best Gaze: 0 - normal   3. Visual: 0 - no visual loss   4. Facial Palsy: 0 - normal symmetric movement   5a. Motor left arm: 0 - no drift, limb holds 90 (or 45) degrees for full 10 seconds   5b.   Motor right arm: 0 - no drift, limb holds 90 (or 45) degrees for full 10 seconds
Endovascular Neurosurgery Progress Note    SUBJECTIVE:   No acute events overnight, pt complaints he didn't get enough sleep     Review of Systems:  CONSTITUTIONAL:  negative for fevers, chills, fatigue and malaise    EYES:  negative for double vision, blurred vision and photophobia     HEENT:  negative for tinnitus, epistaxis and sore throat    RESPIRATORY:  negative for cough, shortness of breath, wheezing    CARDIOVASCULAR:  negative for chest pain, palpitations, syncope, edema    GASTROINTESTINAL:  negative for nausea, vomiting    GENITOURINARY:  negative for incontinence    MUSCULOSKELETAL:  negative for neck or back pain    NEUROLOGICAL:  Negative for weakness and tingling  negative for headaches and dizziness    PSYCHIATRIC:  negative for anxiety      Review of systems otherwise negative. OBJECTIVE:     Vitals:    05/23/23 0844   BP: 115/64   Pulse: 100   Resp:    Temp:    SpO2:         General:  Gen: normal habitus, NAD  HEENT: NCAT, mucosa moist  Cvs: RRR, S1 S2 normal  Resp: symmetric unlabored breathing  Abd: s/nd/nt  Ext: no edema  Skin: no lesions seen, warm and dry    Neuro:  Gen: alert, awake and was oriented to place and person today, not oriented to month, but oriented to year  Lang/speech: dysarthria, follows commands   CN: PERRL, EOMI, VFF, V1-3 intact, face symmetric, hearing intact, shoulder shrug symmetric, tongue midline  Motor: grossly 5/5 UE and LE b/l  Sense: LT intact in all 4 ext. Coord: FTN and HTS intact b/l  DTR: deferred  Gait: narrow base gait    NIH Stroke Scale:   1a  Level of consciousness: 0 - alert; keenly responsive   1b. LOC questions:  0 - answers both questions correctly   1c. LOC commands: 0 - performs both tasks correctly   2. Best Gaze: 0 - normal   3. Visual: 0 - no visual loss   4. Facial Palsy: 0 - normal symmetric movement   5a. Motor left arm: 0 - no drift, limb holds 90 (or 45) degrees for full 10 seconds   5b.   Motor right arm: 0 - no drift, limb holds
Endovascular Neurosurgery Progress Note    SUBJECTIVE:   No acute events overnight, pt just had lunch    Review of Systems:  CONSTITUTIONAL:  negative for fevers, chills, fatigue and malaise    EYES:  negative for double vision, blurred vision and photophobia     HEENT:  negative for tinnitus, epistaxis and sore throat    RESPIRATORY:  negative for cough, shortness of breath, wheezing    CARDIOVASCULAR:  negative for chest pain, palpitations, syncope, edema    GASTROINTESTINAL:  negative for nausea, vomiting    GENITOURINARY:  negative for incontinence    MUSCULOSKELETAL:  negative for neck or back pain    NEUROLOGICAL:  Negative for weakness and tingling  negative for headaches and dizziness    PSYCHIATRIC:  negative for anxiety      Review of systems otherwise negative. OBJECTIVE:     Vitals:    05/24/23 1147   BP: 118/69   Pulse: 69   Resp: 17   Temp: 98.2 °F (36.8 °C)   SpO2: 95%        General:  Gen: normal habitus, NAD  HEENT: NCAT, mucosa moist  Cvs: RRR, S1 S2 normal  Resp: symmetric unlabored breathing  Abd: s/nd/nt  Ext: no edema  Skin: no lesions seen, warm and dry    Neuro:  Gen: alert, awake and was oriented to place and person today, not oriented to month, but oriented to year  Lang/speech: dysarthria, follows commands   CN: PERRL, EOMI, VFF, V1-3 intact, face symmetric, hearing intact, shoulder shrug symmetric, tongue midline  Motor: grossly 5/5 UE and LE b/l  Sense: LT intact in all 4 ext. Coord: FTN and HTS intact b/l  DTR: deferred  Gait: narrow base gait    NIH Stroke Scale:   1a  Level of consciousness: 0 - alert; keenly responsive   1b. LOC questions:  0 - answers both questions correctly   1c. LOC commands: 0 - performs both tasks correctly   2. Best Gaze: 0 - normal   3. Visual: 0 - no visual loss   4. Facial Palsy: 0 - normal symmetric movement   5a. Motor left arm: 0 - no drift, limb holds 90 (or 45) degrees for full 10 seconds   5b.   Motor right arm: 0 - no drift, limb holds 90
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 Received 1 dose of Samsca. Sodium at 135 today. We will start him on hypotonic fluids. Renal function stable . he is asymptomatic. No acute hemodynamic issues overnight. Blood pressure stable    OBJECTIVE     Vitals:    05/23/23 0410 05/23/23 0756 05/23/23 0800 05/23/23 0844   BP:   115/64 115/64   Pulse: 84  86 100   Resp: 13  16    Temp:   98.3 °F (36.8 °C)    TempSrc:   Oral    SpO2: 94% 100% 100%    Weight:       Height:         24HR INTAKE/OUTPUT:    Intake/Output Summary (Last 24 hours) at 5/23/2023 1114  Last data filed at 5/22/2023 1848  Gross per 24 hour   Intake 596.53 ml   Output --   Net 596.53 ml       General appearance:Awake, alert, in no acute distress  HEENT: PERRLA  Respiratory::vesicular breath sounds,no wheeze/crackles  Cardiovascular:S1 S2 normal,no gallop or organic murmur. Abdomen:Non tender/non distended. Bowel sounds present  Extremities: No Cyanosis or Clubbing,Lower extremity edema  Neurological:Alert and oriented. No abnormalities of mood, affect, memory, mentation, or behavior are noted      MEDICATIONS     Scheduled Meds:    dextrose 5%  500 mL IntraVENous Once    potassium bicarb-citric acid  20 mEq Oral 4x Daily    guaiFENesin  600 mg Oral BID    melatonin  5 mg Oral Nightly    metoprolol tartrate  25 mg Oral BID    enoxaparin  30 mg SubCUTAneous BID    ipratropium 0.5 mg-albuterol 2.5 mg  1 ampule Inhalation Q4H WA    famotidine  20 mg Oral BID    senna  1 tablet Oral Nightly    thiamine  100 mg Oral Daily    budesonide-formoterol  2 puff Inhalation BID
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 Received 1 dose of Samsca yesterday. Sodium at 135 today. Renal function stable . he is asymptomatic. No acute hemodynamic issues overnight. Blood pressure stable. UO unmeasured - not documented    OBJECTIVE     Vitals:    05/24/23 0400 05/24/23 0759 05/24/23 0800 05/24/23 0838   BP: 109/64  (!) 121/96 (!) 121/96   Pulse: 69 76 76 75   Resp: 15 17 17    Temp: 98.1 °F (36.7 °C)  98.4 °F (36.9 °C)    TempSrc: Oral  Oral    SpO2: 94%  95%    Weight:       Height:         24HR INTAKE/OUTPUT:    Intake/Output Summary (Last 24 hours) at 5/24/2023 1200  Last data filed at 5/23/2023 1757  Gross per 24 hour   Intake 360 ml   Output --   Net 360 ml       General appearance:Awake, alert, in no acute distress  HEENT: PERRLA  Respiratory::vesicular breath sounds,no wheeze/crackles  Cardiovascular:S1 S2 normal,no gallop or organic murmur. Abdomen:Non tender/non distended. Bowel sounds present  Extremities: No Cyanosis or Clubbing,Lower extremity edema  Neurological:Alert and oriented. No abnormalities of mood, affect, memory, mentation, or behavior are noted      MEDICATIONS     Scheduled Meds:    sodium chloride  1 g Oral BID    guaiFENesin  600 mg Oral BID    melatonin  5 mg Oral Nightly    metoprolol tartrate  25 mg Oral BID    enoxaparin  30 mg SubCUTAneous BID    ipratropium 0.5 mg-albuterol 2.5 mg  1 ampule Inhalation Q4H WA    famotidine  20 mg Oral BID    senna  1 tablet Oral Nightly    thiamine  100 mg Oral Daily    budesonide-formoterol  2 puff Inhalation BID    tamsulosin  0.4 mg Oral Daily    sodium chloride flush  10 mL
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 Boost encouraged. SUBJECTIVE     Consult for hyponatremia attributed to SIADH. Yesterday received hypotonic fluids. Sodium creatinine appropriate at 135 today. Asymptomatic. Plans for discharge today    OBJECTIVE     Vitals:    05/24/23 0759 05/24/23 0800 05/24/23 0838 05/24/23 1147   BP:  (!) 121/96 (!) 121/96 118/69   Pulse: 76 76 75 69   Resp: 17 17 17   Temp:  98.4 °F (36.9 °C)  98.2 °F (36.8 °C)   TempSrc:  Oral  Oral   SpO2:  95%  95%   Weight:       Height:         24HR INTAKE/OUTPUT:    Intake/Output Summary (Last 24 hours) at 5/24/2023 1237  Last data filed at 5/24/2023 1223  Gross per 24 hour   Intake 1080 ml   Output --   Net 1080 ml       General appearance:Awake, alert, in no acute distress  HEENT: PERRLA  Respiratory::vesicular breath sounds,no wheeze/crackles  Cardiovascular:S1 S2 normal,no gallop or organic murmur. Abdomen:Non tender/non distended. Bowel sounds present  Extremities: No Cyanosis or Clubbing,Lower extremity edema  Neurological:Alert and oriented. No abnormalities of mood, affect, memory, mentation, or behavior are noted      MEDICATIONS     Scheduled Meds:    sodium chloride  1 g Oral BID    guaiFENesin  600 mg Oral BID    melatonin  5 mg Oral Nightly    metoprolol tartrate  25 mg Oral BID    enoxaparin  30 mg SubCUTAneous BID    ipratropium 0.5 mg-albuterol 2.5 mg  1 ampule Inhalation Q4H WA    famotidine  20 mg Oral BID    senna  1 tablet Oral Nightly    thiamine  100 mg Oral Daily    budesonide-formoterol  2 puff Inhalation BID    tamsulosin  0.4 mg Oral Daily    sodium chloride flush  10 mL IntraVENous 2 times per day    polyethylene glycol  17 g Oral Daily    acetaminophen  1,000 mg Oral 3 times per
Occupational Therapy  Facility/Department: [address] STEPDOWN  Occupational Therapy Daily Progress Note    Name: Saralee Reusing. Kristene Spurling  : 1960  MRN: [id number]  Date of Service: 2023    Discharge Recommendations:  Patient would benefit from continued therapy after discharge  OT Equipment Recommendations  Equipment Needed: Yes  ADL Assistive Devices: Toileting - 3-in-1 Commode       Patient Diagnosis(es): The primary encounter diagnosis was Intracranial hemorrhage ([address] A diagnosis of Subdural hematoma (HCC) was also pertinent to this visit. Past Medical History:  has no past medical history on file. Past Surgical History:  has no past surgical history on file. Assessment   Performance deficits / Impairments: Decreased functional mobility ; Decreased ADL status; Decreased safe awareness;Decreased cognition;Decreased balance;Decreased high-level IADLs;Decreased endurance  Prognosis: Good  Decision Making: Medium Complexity  REQUIRES OT FOLLOW-UP: Yes  Activity Tolerance  Activity Tolerance: Treatment limited secondary to decreased cognition;Treatment limited secondary to agitation        Plan   Occupational Therapy Plan  Times Per Week: 3-5/week  Current Treatment Recommendations: Balance training, Functional mobility training, Endurance training, Safety education & training, Equipment evaluation, education, & procurement, Self-Care / ADL, Patient/Caregiver education & training, Cognitive/Perceptual training     Restrictions  Restrictions/Precautions  Restrictions/Precautions: Weight Bearing  Required Braces or Orthoses?: No  Lower Extremity Weight Bearing Restrictions  Right Lower Extremity Weight Bearing: Weight Bearing As Tolerated    Subjective   General  Patient assessed for rehabilitation services?: Yes  Family / Caregiver Present: No  General Comment  Comments: RN okayed for therapy. Pt agreeable and cooperative with encouragement needed throughout.  Pt denies pain            Objective
PROGRESS NOTE          PATIENT NAME: Guadalupe Farrell [address] [id number]  DATE: 5/22/2023  SURGEON: Dr. Val Tomlinson: No primary care provider on file. HD: # 8    ASSESSMENT    Patient Active Problem List   Diagnosis    Subdural hematoma (HCC)    Intracranial hemorrhage (HCC)    Atrial fibrillation ([facility]    Encephalopathy acute    Hyponatremia    Alcohol abuse       MEDICAL DECISION MAKING AND PLAN    [facility]  [facility]  Neurosurgery consulted  Signed off. F/u 2 weeks  Sacral fracture  R IT fracture  Orthopedic surgery  - No plan for orthopaedic intervention at this time  - WB Status: WBAT RLE  - Ice for Pain and Swelling  - Ok to discharge from orthopaedic perspective  - Follow up Dr. Taye Barrera' clinic on 5/26/23 at 8:20am  F/u 5/26/23  Geriatrics consult-followup  Pending / follow up with geriatrics on 5/22  Afib rvr- [id number] [id number]  Cardiology consulted  Echo completed  GI  Continue diet  Start supplements  Hypomagnesemia  Replace  Hyponatremia:  Low on presentation. Chronic alcoholic  Fluid restricted 1200cc/day; BID salt tablets  5/21: Na level of 120; down from 124 and 125  Nephrology consulted:  Recs: We will check urine sodium, urine osmolality and serum osmolality  1200 mL fluid restriction and diet is advanced  No IV fluids  Sodium levels every 8 hours  BMP in a.m. Continue phenobarbital taper  Daily drinker and hx withdrawals, phenobarital taper with folvit and thiamin, ciwa no meds  Pain management  Continue Tylenol  Cough:  Afebrile, no leukocytosis, COPD as baseline  Upper airway phlegm - ordered Mucinex to help with congestion and break up mucous. Dispo  PT/ot  Medically stable for discharge to SNF following final nephrology recs. Accepted to [facility], pending pre-cert      SUBJECTIVE    Guadalupe Engle. Julio C Miles was seen at bedside. This AM the patient was resting comfortably in bed. Denies pain. Tolerating diet. Voiding. Working with PT and OT.  States that he feels better
Physical Therapy  Facility/Department: [address] STEPDOWN  Daily Treatment Note  NAME: Nohemy Adan  : 1960  MRN: [id number]    Date of Service: 2023    Discharge Recommendations:  Therapy recommended at discharge   PT Equipment Recommendations  Equipment Needed: Yes  Mobility Devices: Wallie Munch: Rolling    Patient Diagnosis(es): The primary encounter diagnosis was Intracranial hemorrhage ([address] A diagnosis of Subdural hematoma (HCC) was also pertinent to this visit. Assessment   Pt reluctantly cooperative, very slow to move, very reluctant to stand/take steps, but able to be persuaded to stand, and take steps to [facility]. Very flexed posture, takes small steps, tends to stand on toes LLE and c/o RLE pain more than L, but moves RLE better than LLE. Pt is currently usafe for home DC and would likely benefit from continued aggressive therapies to improve functional mobility/independence. Activity Tolerance: Patient limited by pain;Treatment limited secondary to decreased cognition  Equipment Needed: Yes  Mobility Devices: [address] Therapy Plan  General Plan:  (5-6 visits weekly)  Current Treatment Recommendations: Strengthening;ROM;Balance training;Functional mobility training;Transfer training;Gait training;Stair training; Endurance training;Home exercise program;Safety education & training;Patient/Caregiver education & training; Therapeutic activities; Neuromuscular re-education;Pain management;Equipment evaluation, education, & procurement;Positioning  PT Plan of Care: Daily     Restrictions  Restrictions/Precautions  Restrictions/Precautions: Weight Bearing  Required Braces or Orthoses?: No  Lower Extremity Weight Bearing Restrictions  Right Lower Extremity Weight Bearing: Weight Bearing As Tolerated     Subjective    Subjective  Subjective: no pain at rest, c/o increased pain with mobility but didn't rank; states his RLE hurts, but exhibits decreased mobility with the LLE
Renal Progress Note    Patient :  Liseth Patton; 70 y.o. MRN# [id number]  Location:  0945/0342-45  Attending:  Barrera Menon MD  Admit Date:  5/14/2023   Hospital Day: 8      Subjective:     Oral intake to be advanced, patient passed a swallow study. Urine output fair although not being recorded accurately. Hemodynamically stable. No shortness of breath orthopnea. No fever or chills. Sodium improving slowly up to 124 now which is an appropriate correction so far. Had an acute drop in sodium on 5/19/2023. Urine osmolality 473 urine sodium 137 picture consistent with SIADH likely from pain and discomfort and closed head injury. Being considered for MMA embolization at some point down the road. Labs 5/22/2023: Sodium 124 potassium 3.6 creatinine 0.3 calcium 8.1 hemoglobin 12.3    History reviewed known history of alcohol dependence and abuse, presented to the hospital after he was found down at a gas station. Imaging confirmed presence of intraparenchymal hemorrhage and subdural hemorrhage was transferred for neurosurgical intervention. No intervention done. He has been drinking 6 tall boy cans of beer a day. Sodium was 120 on presentation which then improved to 135 over. Of 4 days. Subsequently was placed on IV fluid sodium dropped down to 117 yesterday, nephrology was consulted. Received a dose of Lasix and tolvaptan sodium is improving slowly. Does have significant compromised solute load as well as indicated below BUN and creatinine      Outpatient Medications:     No medications prior to admission.     Current Medications:     Scheduled Meds:    QUEtiapine  12.5 mg Oral Nightly    potassium chloride  20 mEq Oral Once    tolvaptan  7.5 mg Oral Once    potassium bicarb-citric acid  20 mEq Oral 4x Daily    guaiFENesin  600 mg Oral BID    melatonin  5 mg Oral Nightly    metoprolol tartrate  25 mg Oral BID    enoxaparin  30 mg SubCUTAneous BID    ipratropium 0.5 mg-albuterol 2.5 mg  1 ampule
Report called to Keo Mckeon RN at [facility].
Speech Language Pathology  [facility]    Cognitive Treatment Note    Date: 5/23/2023  Patients Name: You Lloyd  MRN: [id number]  Diagnosis:   Patient Active Problem List   Diagnosis Code    Subdural hematoma ([facility] [address] S06. 5XAA    Intracranial hemorrhage (HCC) I62.9    Atrial fibrillation (HCC) I48.91    Encephalopathy acute G93.40    Hyponatremia E87.1    Alcohol abuse F10.10       Cognitive Treatment    Treatment time: 2307-9327      Subjective: [x] Alert [x] Cooperative     [] Confused     [] Agitated    [] Lethargic      Objective/Assessment:  Attention: Pt easily distracted throughout therapy session. Required verbal cues to redirect back to task. Recall: Delayed Recall-Related Words: 0/3 increased to 3/3 with min-mod cues, Word List Retention-Attribute: 2/6 did not increase with max cues    Problem Solving/Reasoning: Opposites: 3/8 increased to 6/8 with min cues, Category Members-[place]: 4/8 did not increase with max cues, 3/8 increased to 4/8 with min cue, 5/8 increased to 6/8 with min cues, Similarities/Differences: 0/4 did not increase with max cues (only able to describe a difference independently)    Plan:  [x] Continue  services    [] Discharge from [address] [address] Dr:      Discharge recommendations: []  Further therapy recommended at discharge. The patient should be able to tolerate at least 3 hours of therapy per day over 5 days or 15 hours over 7 days. [x] Further therapy recommended at discharge. [] No therapy recommended at discharge. Completed by Kenny Valencia  Clinician    Co-signed by Alberta MILLER.CCC/SLP
Working with PT and OT. States that he feels better than yesterday. VSS, afebrile. Answering questions appropriately. Patient feels much better. Patient is much more interactive with provider than previous past 4 days. OBJECTIVE  VITALS: Temp: Temp: 97.9 °F (36.6 °C)Temp  Av.3 °F (36.8 °C)  Min: 97.6 °F (36.4 °C)  Max: 99 °F (65.6 °C) BP Systolic (90GSS), BZJ:628 , Min:98 , APL:514   Diastolic (83QAX), NXC:14, Min:59, Max:75   Pulse Pulse  Av.3  Min: 65  Max: 95 Resp Resp  Av.9  Min: 12  Max: 26 Pulse ox SpO2  Av.2 %  Min: 88 %  Max: 99 %  GENERAL: alert, cooperative  NEURO: Alert, answers questions appropriately. HEENT: EOMI  LUNGS: unlabored  HEART: normal rate and irregular rhythm, O2 saturation at 92%  ABDOMEN: soft, non-tender, non-distended  EXTREMITY: no cyanosis, clubbing or edema    I/O last 3 completed shifts: In: 696.5 [P.O.:640; IV Piggyback:56.5]  Out: -     Drain/tube output: In: 696.5 [P.O.:640]  Out: -     LAB:  CBC:   Recent Labs     23  0745 23  0417 23  0348   WBC 4.4 4.5 4.6   HGB 11.3* 12.3* 11.4*   HCT 32.3* 35.0* 32.8*   MCV 97.9 97.5 99.4    197 240       BMP:   Recent Labs     23  0745 23  1129 23  0417 23  0747 23  1555 23  2232 23  0348   *   < > 120*   < > 126* 129* 133*   K 3.9  --  3.6*  --  3.5* 3.8 3.9   CL 86*  --  85*  --  91* 94* 98   CO2 23  --  21  --  25 24 24   BUN 3*  --  5*  --   --   --  5*   CREATININE 0.23*  --  0.28*  --   --   --  0.43*   GLUCOSE 108*  --  89  --   --   --  98    < > = values in this interval not displayed. COAGS:   No results for input(s): APTT, PROT, INR in the last 72 hours. RADIOLOGY:  CXR  IMPRESSION:  1. Acute bilateral pulmonary opacities more prominent the right. This could  represent multifocal pneumonia versus possible edema with superimposed  pneumonia. 2. No significant pleural effusion.       Angelo Lantigua DO  23, 6:28
mg Oral BID    senna  1 tablet Oral Nightly    thiamine  100 mg Oral Daily    budesonide-formoterol  2 puff Inhalation BID    tamsulosin  0.4 mg Oral Daily    sodium chloride flush  10 mL IntraVENous 2 times per day    polyethylene glycol  17 g Oral Daily    acetaminophen  1,000 mg Oral 3 times per day    folic acid  1 mg Oral Daily    multivitamin  1 tablet Oral Daily     Continuous Infusions:  PRN Meds:.ipratropium 0.5 mg-albuterol 2.5 mg, albuterol sulfate HFA, sodium chloride flush, ondansetron **OR** ondansetron     Diagnostics:     CBC:   Recent Labs     05/21/23  0745 05/22/23  0417 05/23/23  0348   WBC 4.4 4.5 4.6   RBC 3.30* 3.59* 3.30*   HGB 11.3* 12.3* 11.4*   HCT 32.3* 35.0* 32.8*   MCV 97.9 97.5 99.4   RDW 11.9 11.9 12.1    197 240       BMP:   Recent Labs     05/21/23  0745 05/21/23  1129 05/22/23  0417 05/22/23  0747 05/22/23  2232 05/23/23  0348 05/23/23  0931   *   < > 120*   < > 129* 133* 135   K 3.9  --  3.6*   < > 3.8 3.9 3.5*   CL 86*  --  85*   < > 94* 98 97*   CO2 23  --  21   < > 24 24 26   PHOS  --   --   --   --   --  4.3  --    BUN 3*  --  5*  --   --  5*  --    CREATININE 0.23*  --  0.28*  --   --  0.43*  --     < > = values in this interval not displayed. BNP: No results for input(s): BNP in the last 72 hours. PT/INR: No results for input(s): PROTIME, INR in the last 72 hours. APTT: No results for input(s): APTT in the last 72 hours. CARDIAC ENZYMES: No results for input(s): CKMB, CKMBINDEX, TROPONINT in the last 72 hours. Invalid input(s): CKTOTAL;3  FASTING LIPID PANEL:No results found for: CHOL, HDL, TRIG  LIVER PROFILE: No results for input(s): AST, ALT, ALB, BILIDIR, BILITOT, ALKPHOS in the last 72 hours. I/O (24Hr):     Intake/Output Summary (Last 24 hours) at 5/23/2023 1053  Last data filed at 5/22/2023 1848  Gross per 24 hour   Intake 596.53 ml   Output --   Net 596.53 ml       Glu last 24 hour  No results for input(s): POCGLU in the last 72
nephrology follow      DVT Prophylaxis: On Lovenox        Jassi Olivas MD       This note is created with the assistance of a speech recognition program.  While intending to generate a document that actually reflects the content of the visit, the document can still have some errors including those of syntax and sound a like substitutions which may escape proof reading.   In such instances, actual meaning can be extrapolated by contextual diversion

## 2023-06-06 ENCOUNTER — TELEPHONE (OUTPATIENT)
Dept: NEUROSURGERY | Age: 63
End: 2023-06-06

## 2023-06-06 NOTE — TELEPHONE ENCOUNTER
Reached out to patient to cancel upcoming appointment with Dr. Sherri Lares on 06/07/23. Patient is scheduled to see Dr. Charles Acosta on 06/23/23. Patient is currently at [place] [id number].

## 2023-06-08 PROBLEM — F05 DELIRIUM DUE TO MEDICAL CONDITION WITH BEHAVIORAL DISTURBANCE: Status: ACTIVE | Noted: 2023-06-08

## 2023-06-25 ENCOUNTER — APPOINTMENT (OUTPATIENT)
Dept: CT IMAGING | Age: 63
End: 2023-06-25
Payer: MEDICAID

## 2023-06-25 ENCOUNTER — APPOINTMENT (OUTPATIENT)
Dept: GENERAL RADIOLOGY | Age: 63
End: 2023-06-25
Payer: MEDICAID

## 2023-06-25 ENCOUNTER — HOSPITAL ENCOUNTER (EMERGENCY)
Age: 63
Discharge: HOME | End: 2023-06-25
Attending: STUDENT IN AN ORGANIZED HEALTH CARE EDUCATION/TRAINING PROGRAM
Payer: MEDICAID

## 2023-06-25 VITALS
RESPIRATION RATE: 16 BRPM | WEIGHT: 103.5 LBS | SYSTOLIC BLOOD PRESSURE: 139 MMHG | DIASTOLIC BLOOD PRESSURE: 74 MMHG | OXYGEN SATURATION: 98 % | BODY MASS INDEX: 16.21 KG/M2 | HEART RATE: 71 BPM | TEMPERATURE: 98.1 F

## 2023-06-25 DIAGNOSIS — L02.01 SUBMENTAL ABSCESS: Primary | ICD-10-CM

## 2023-06-25 LAB
ALBUMIN SERPL-MCNC: 4.3 G/DL (ref 3.5–5.2)
ALP SERPL-CCNC: 95 U/L (ref 40–129)
ALT SERPL-CCNC: 13 U/L (ref 5–41)
ANION GAP SERPL CALCULATED.3IONS-SCNC: 13 MMOL/L (ref 9–17)
AST SERPL-CCNC: 28 U/L
BACTERIA URNS QL MICRO: ABNORMAL
BASOPHILS # BLD: 0.1 K/UL (ref 0–0.2)
BASOPHILS NFR BLD: 1 % (ref 0–2)
BILIRUB SERPL-MCNC: 0.8 MG/DL (ref 0.3–1.2)
BILIRUB UR QL STRIP: NEGATIVE
BUN SERPL-MCNC: 5 MG/DL (ref 8–23)
CALCIUM SERPL-MCNC: 9.8 MG/DL (ref 8.6–10.4)
CASTS #/AREA URNS LPF: ABNORMAL /LPF
CHLORIDE SERPL-SCNC: 97 MMOL/L (ref 98–107)
CLARITY UR: CLEAR
CO2 SERPL-SCNC: 22 MMOL/L (ref 20–31)
COLOR UR: ABNORMAL
CREAT SERPL-MCNC: <0.4 MG/DL (ref 0.7–1.2)
EOSINOPHIL # BLD: 0.2 K/UL (ref 0–0.4)
EOSINOPHILS RELATIVE PERCENT: 2 % (ref 0–4)
EPI CELLS #/AREA URNS HPF: ABNORMAL /HPF
ERYTHROCYTE [DISTWIDTH] IN BLOOD BY AUTOMATED COUNT: 14.9 % (ref 11.5–14.9)
GFR SERPL CREATININE-BSD FRML MDRD: ABNORMAL ML/MIN/1.73M2
GLUCOSE SERPL-MCNC: 103 MG/DL (ref 70–99)
GLUCOSE UR STRIP-MCNC: NEGATIVE MG/DL
HCT VFR BLD AUTO: 36.3 % (ref 41–53)
HGB BLD-MCNC: 12 G/DL (ref 13.5–17.5)
HGB UR QL STRIP.AUTO: ABNORMAL
KETONES UR STRIP-MCNC: NEGATIVE MG/DL
LACTATE BLDV-SCNC: 1.2 MMOL/L (ref 0.5–1.9)
LEUKOCYTE ESTERASE UR QL STRIP: NEGATIVE
LYMPHOCYTES # BLD: 16 % (ref 24–44)
LYMPHOCYTES NFR BLD: 1.3 K/UL (ref 1–4.8)
MAGNESIUM SERPL-MCNC: 1.3 MG/DL (ref 1.6–2.6)
MCH RBC QN AUTO: 33.8 PG (ref 26–34)
MCHC RBC AUTO-ENTMCNC: 33 G/DL (ref 31–37)
MCV RBC AUTO: 102.5 FL (ref 80–100)
MONOCYTES NFR BLD: 0.5 K/UL (ref 0.1–1.3)
MONOCYTES NFR BLD: 6 % (ref 1–7)
NEUTROPHILS NFR BLD: 75 % (ref 36–66)
NEUTS SEG NFR BLD: 6 K/UL (ref 1.3–9.1)
NITRITE UR QL STRIP: NEGATIVE
PH UR STRIP: 7.5 [PH] (ref 5–8)
PLATELET # BLD AUTO: 267 K/UL (ref 150–450)
PMV BLD AUTO: 7 FL (ref 6–12)
POTASSIUM SERPL-SCNC: 4.8 MMOL/L (ref 3.7–5.3)
PROT SERPL-MCNC: 7.5 G/DL (ref 6.4–8.3)
PROT UR STRIP-MCNC: ABNORMAL MG/DL
RBC # BLD AUTO: 3.54 M/UL (ref 4.5–5.9)
RBC #/AREA URNS HPF: ABNORMAL /HPF
SODIUM SERPL-SCNC: 132 MMOL/L (ref 135–144)
SP GR UR STRIP: 1.01 (ref 1–1.03)
UROBILINOGEN UR STRIP-ACNC: ABNORMAL
WBC #/AREA URNS HPF: ABNORMAL /HPF
WBC OTHER # BLD: 8.1 K/UL (ref 3.5–11)

## 2023-06-25 PROCEDURE — 81001 URINALYSIS AUTO W/SCOPE: CPT

## 2023-06-25 PROCEDURE — 99285 EMERGENCY DEPT VISIT HI MDM: CPT

## 2023-06-25 PROCEDURE — 71045 X-RAY EXAM CHEST 1 VIEW: CPT

## 2023-06-25 PROCEDURE — 96365 THER/PROPH/DIAG IV INF INIT: CPT

## 2023-06-25 PROCEDURE — 10060 I&D ABSCESS SIMPLE/SINGLE: CPT

## 2023-06-25 PROCEDURE — 85027 COMPLETE CBC AUTOMATED: CPT

## 2023-06-25 PROCEDURE — 36415 COLL VENOUS BLD VENIPUNCTURE: CPT

## 2023-06-25 PROCEDURE — 2580000003 HC RX 258: Performed by: STUDENT IN AN ORGANIZED HEALTH CARE EDUCATION/TRAINING PROGRAM

## 2023-06-25 PROCEDURE — 2500000003 HC RX 250 WO HCPCS: Performed by: STUDENT IN AN ORGANIZED HEALTH CARE EDUCATION/TRAINING PROGRAM

## 2023-06-25 PROCEDURE — 6360000002 HC RX W HCPCS: Performed by: STUDENT IN AN ORGANIZED HEALTH CARE EDUCATION/TRAINING PROGRAM

## 2023-06-25 PROCEDURE — 83735 ASSAY OF MAGNESIUM: CPT

## 2023-06-25 PROCEDURE — 96366 THER/PROPH/DIAG IV INF ADDON: CPT

## 2023-06-25 PROCEDURE — 96367 TX/PROPH/DG ADDL SEQ IV INF: CPT

## 2023-06-25 PROCEDURE — 83605 ASSAY OF LACTIC ACID: CPT

## 2023-06-25 PROCEDURE — 80053 COMPREHEN METABOLIC PANEL: CPT

## 2023-06-25 PROCEDURE — 87040 BLOOD CULTURE FOR BACTERIA: CPT

## 2023-06-25 PROCEDURE — 87086 URINE CULTURE/COLONY COUNT: CPT

## 2023-06-25 PROCEDURE — 96368 THER/DIAG CONCURRENT INF: CPT

## 2023-06-25 PROCEDURE — 70491 CT SOFT TISSUE NECK W/DYE: CPT

## 2023-06-25 PROCEDURE — 6360000004 HC RX CONTRAST MEDICATION: Performed by: STUDENT IN AN ORGANIZED HEALTH CARE EDUCATION/TRAINING PROGRAM

## 2023-06-25 PROCEDURE — 96375 TX/PRO/DX INJ NEW DRUG ADDON: CPT

## 2023-06-25 RX ORDER — MORPHINE SULFATE 4 MG/ML
4 INJECTION, SOLUTION INTRAMUSCULAR; INTRAVENOUS ONCE
Status: COMPLETED | OUTPATIENT
Start: 2023-06-25 | End: 2023-06-25

## 2023-06-25 RX ORDER — SODIUM CHLORIDE 9 MG/ML
INJECTION, SOLUTION INTRAVENOUS PRN
Status: DISCONTINUED | OUTPATIENT
Start: 2023-06-25 | End: 2023-06-25 | Stop reason: HOSPADM

## 2023-06-25 RX ORDER — 0.9 % SODIUM CHLORIDE 0.9 %
100 INTRAVENOUS SOLUTION INTRAVENOUS ONCE
Status: COMPLETED | OUTPATIENT
Start: 2023-06-25 | End: 2023-06-25

## 2023-06-25 RX ORDER — 0.9 % SODIUM CHLORIDE 0.9 %
30 INTRAVENOUS SOLUTION INTRAVENOUS ONCE
Status: COMPLETED | OUTPATIENT
Start: 2023-06-25 | End: 2023-06-25

## 2023-06-25 RX ORDER — SODIUM CHLORIDE 0.9 % (FLUSH) 0.9 %
5-40 SYRINGE (ML) INJECTION EVERY 12 HOURS SCHEDULED
Status: DISCONTINUED | OUTPATIENT
Start: 2023-06-25 | End: 2023-06-25 | Stop reason: HOSPADM

## 2023-06-25 RX ORDER — CLINDAMYCIN HYDROCHLORIDE 300 MG/1
300 CAPSULE ORAL 3 TIMES DAILY
Qty: 30 CAPSULE | Refills: 0 | Status: SHIPPED | OUTPATIENT
Start: 2023-06-25 | End: 2023-07-05

## 2023-06-25 RX ORDER — SODIUM CHLORIDE 0.9 % (FLUSH) 0.9 %
5-40 SYRINGE (ML) INJECTION PRN
Status: DISCONTINUED | OUTPATIENT
Start: 2023-06-25 | End: 2023-06-25 | Stop reason: HOSPADM

## 2023-06-25 RX ORDER — BACITRACIN ZINC AND POLYMYXIN B SULFATE 500; 1000 [USP'U]/G; [USP'U]/G
OINTMENT TOPICAL
Qty: 15 G | Refills: 1 | Status: SHIPPED | OUTPATIENT
Start: 2023-06-25 | End: 2023-07-02

## 2023-06-25 RX ORDER — SODIUM CHLORIDE 0.9 % (FLUSH) 0.9 %
10 SYRINGE (ML) INJECTION PRN
Status: DISCONTINUED | OUTPATIENT
Start: 2023-06-25 | End: 2023-06-25 | Stop reason: HOSPADM

## 2023-06-25 RX ORDER — LINEZOLID 2 MG/ML
600 INJECTION, SOLUTION INTRAVENOUS EVERY 12 HOURS
Status: DISCONTINUED | OUTPATIENT
Start: 2023-06-25 | End: 2023-06-25 | Stop reason: HOSPADM

## 2023-06-25 RX ORDER — MAGNESIUM SULFATE HEPTAHYDRATE 40 MG/ML
2000 INJECTION, SOLUTION INTRAVENOUS ONCE
Status: COMPLETED | OUTPATIENT
Start: 2023-06-25 | End: 2023-06-25

## 2023-06-25 RX ADMIN — METHYLPREDNISOLONE SODIUM SUCCINATE 125 MG: 125 INJECTION, POWDER, FOR SOLUTION INTRAMUSCULAR; INTRAVENOUS at 16:23

## 2023-06-25 RX ADMIN — IOPAMIDOL 75 ML: 755 INJECTION, SOLUTION INTRAVENOUS at 17:03

## 2023-06-25 RX ADMIN — MORPHINE SULFATE 4 MG: 4 INJECTION, SOLUTION INTRAMUSCULAR; INTRAVENOUS at 16:25

## 2023-06-25 RX ADMIN — SODIUM CHLORIDE 100 ML: 9 INJECTION, SOLUTION INTRAVENOUS at 17:03

## 2023-06-25 RX ADMIN — PIPERACILLIN AND TAZOBACTAM 4500 MG: 4; .5 INJECTION, POWDER, LYOPHILIZED, FOR SOLUTION INTRAVENOUS at 16:22

## 2023-06-25 RX ADMIN — LINEZOLID 600 MG: 600 INJECTION, SOLUTION INTRAVENOUS at 16:30

## 2023-06-25 RX ADMIN — SODIUM CHLORIDE, PRESERVATIVE FREE 10 ML: 5 INJECTION INTRAVENOUS at 17:03

## 2023-06-25 RX ADMIN — MAGNESIUM SULFATE HEPTAHYDRATE 2000 MG: 40 INJECTION, SOLUTION INTRAVENOUS at 17:33

## 2023-06-25 RX ADMIN — SODIUM CHLORIDE 1407 ML: 9 INJECTION, SOLUTION INTRAVENOUS at 16:20

## 2023-06-25 ASSESSMENT — LIFESTYLE VARIABLES
HOW OFTEN DO YOU HAVE A DRINK CONTAINING ALCOHOL: MONTHLY OR LESS
HOW MANY STANDARD DRINKS CONTAINING ALCOHOL DO YOU HAVE ON A TYPICAL DAY: 1 OR 2

## 2023-06-25 ASSESSMENT — PAIN DESCRIPTION - DESCRIPTORS: DESCRIPTORS: ACHING;SQUEEZING

## 2023-06-25 ASSESSMENT — PAIN DESCRIPTION - LOCATION: LOCATION: JAW

## 2023-06-25 ASSESSMENT — PAIN SCALES - GENERAL
PAINLEVEL_OUTOF10: 7
PAINLEVEL_OUTOF10: 0

## 2023-06-26 LAB
MICROORGANISM SPEC CULT: NORMAL
SPECIMEN DESCRIPTION: NORMAL

## 2023-06-26 ASSESSMENT — ENCOUNTER SYMPTOMS
RHINORRHEA: 0
VOMITING: 0
DIARRHEA: 0
ABDOMINAL PAIN: 0
EYE REDNESS: 0
NAUSEA: 0
SHORTNESS OF BREATH: 0

## 2023-06-28 ENCOUNTER — TELEPHONE (OUTPATIENT)
Dept: INTERNAL MEDICINE CLINIC | Age: 63
End: 2023-06-28

## 2023-06-30 LAB
MICROORGANISM SPEC CULT: NORMAL
MICROORGANISM SPEC CULT: NORMAL
SERVICE CMNT-IMP: NORMAL
SERVICE CMNT-IMP: NORMAL
SPECIMEN DESCRIPTION: NORMAL
SPECIMEN DESCRIPTION: NORMAL